# Patient Record
Sex: MALE | Race: WHITE | URBAN - METROPOLITAN AREA
[De-identification: names, ages, dates, MRNs, and addresses within clinical notes are randomized per-mention and may not be internally consistent; named-entity substitution may affect disease eponyms.]

---

## 2022-05-26 ENCOUNTER — INPATIENT (INPATIENT)
Facility: HOSPITAL | Age: 87
LOS: 3 days | Discharge: HOME | DRG: 57 | End: 2022-05-30
Attending: FAMILY MEDICINE | Admitting: INTERNAL MEDICINE
Payer: MEDICARE

## 2022-05-26 VITALS
HEIGHT: 73 IN | HEART RATE: 64 BPM | WEIGHT: 235.01 LBS | OXYGEN SATURATION: 98 % | DIASTOLIC BLOOD PRESSURE: 74 MMHG | SYSTOLIC BLOOD PRESSURE: 142 MMHG | TEMPERATURE: 99 F | RESPIRATION RATE: 17 BRPM

## 2022-05-26 LAB
ALBUMIN SERPL ELPH-MCNC: 3.1 G/DL — LOW (ref 3.3–5)
ALP SERPL-CCNC: 85 U/L — SIGNIFICANT CHANGE UP (ref 40–120)
ALT FLD-CCNC: 21 U/L — SIGNIFICANT CHANGE UP (ref 12–78)
ANION GAP SERPL CALC-SCNC: 7 MMOL/L — SIGNIFICANT CHANGE UP (ref 5–17)
APAP SERPL-MCNC: <2 UG/ML — LOW (ref 10–30)
AST SERPL-CCNC: 17 U/L — SIGNIFICANT CHANGE UP (ref 15–37)
BASOPHILS # BLD AUTO: 0.04 K/UL — SIGNIFICANT CHANGE UP (ref 0–0.2)
BASOPHILS NFR BLD AUTO: 0.3 % — SIGNIFICANT CHANGE UP (ref 0–2)
BILIRUB SERPL-MCNC: 0.3 MG/DL — SIGNIFICANT CHANGE UP (ref 0.2–1.2)
BUN SERPL-MCNC: 29 MG/DL — HIGH (ref 7–23)
CALCIUM SERPL-MCNC: 9.1 MG/DL — SIGNIFICANT CHANGE UP (ref 8.5–10.1)
CHLORIDE SERPL-SCNC: 106 MMOL/L — SIGNIFICANT CHANGE UP (ref 96–108)
CO2 SERPL-SCNC: 30 MMOL/L — SIGNIFICANT CHANGE UP (ref 22–31)
CREAT SERPL-MCNC: 0.93 MG/DL — SIGNIFICANT CHANGE UP (ref 0.5–1.3)
EGFR: 80 ML/MIN/1.73M2 — SIGNIFICANT CHANGE UP
EOSINOPHIL # BLD AUTO: 0.36 K/UL — SIGNIFICANT CHANGE UP (ref 0–0.5)
EOSINOPHIL NFR BLD AUTO: 3 % — SIGNIFICANT CHANGE UP (ref 0–6)
ETHANOL SERPL-MCNC: <10 MG/DL — SIGNIFICANT CHANGE UP (ref 0–10)
GLUCOSE SERPL-MCNC: 123 MG/DL — HIGH (ref 70–99)
HCT VFR BLD CALC: 38.6 % — LOW (ref 39–50)
HGB BLD-MCNC: 12.5 G/DL — LOW (ref 13–17)
IMM GRANULOCYTES NFR BLD AUTO: 0.4 % — SIGNIFICANT CHANGE UP (ref 0–1.5)
LYMPHOCYTES # BLD AUTO: 2.5 K/UL — SIGNIFICANT CHANGE UP (ref 1–3.3)
LYMPHOCYTES # BLD AUTO: 21 % — SIGNIFICANT CHANGE UP (ref 13–44)
MCHC RBC-ENTMCNC: 26.7 PG — LOW (ref 27–34)
MCHC RBC-ENTMCNC: 32.4 GM/DL — SIGNIFICANT CHANGE UP (ref 32–36)
MCV RBC AUTO: 82.3 FL — SIGNIFICANT CHANGE UP (ref 80–100)
MONOCYTES # BLD AUTO: 1 K/UL — HIGH (ref 0–0.9)
MONOCYTES NFR BLD AUTO: 8.4 % — SIGNIFICANT CHANGE UP (ref 2–14)
NEUTROPHILS # BLD AUTO: 7.95 K/UL — HIGH (ref 1.8–7.4)
NEUTROPHILS NFR BLD AUTO: 66.9 % — SIGNIFICANT CHANGE UP (ref 43–77)
PCP SPEC-MCNC: SIGNIFICANT CHANGE UP
PLATELET # BLD AUTO: 284 K/UL — SIGNIFICANT CHANGE UP (ref 150–400)
POTASSIUM SERPL-MCNC: 3.7 MMOL/L — SIGNIFICANT CHANGE UP (ref 3.5–5.3)
POTASSIUM SERPL-SCNC: 3.7 MMOL/L — SIGNIFICANT CHANGE UP (ref 3.5–5.3)
PROT SERPL-MCNC: 6.3 GM/DL — SIGNIFICANT CHANGE UP (ref 6–8.3)
RBC # BLD: 4.69 M/UL — SIGNIFICANT CHANGE UP (ref 4.2–5.8)
RBC # FLD: 15.3 % — HIGH (ref 10.3–14.5)
SALICYLATES SERPL-MCNC: <1.7 MG/DL — LOW (ref 2.8–20)
SODIUM SERPL-SCNC: 143 MMOL/L — SIGNIFICANT CHANGE UP (ref 135–145)
WBC # BLD: 11.9 K/UL — HIGH (ref 3.8–10.5)
WBC # FLD AUTO: 11.9 K/UL — HIGH (ref 3.8–10.5)

## 2022-05-26 PROCEDURE — 99285 EMERGENCY DEPT VISIT HI MDM: CPT

## 2022-05-26 PROCEDURE — 93010 ELECTROCARDIOGRAM REPORT: CPT

## 2022-05-26 NOTE — ED PROVIDER NOTE - PROGRESS NOTE DETAILS
SILVANAG:  Received signout from Dr. Kwok to follow up labs and call telepsych for consult due to SI statements.  Patient calm and cooperative for interview.  No SI endorsed. Telepsych attending aware. JG:  Nurse reports patient sundowning, talking to self, states he was talking to a group of ladies on the side but nurse states nobody was there.  UA negative.  Vitals stable. Telepsych consult pending. JG:  Dr. Corrigan cannot assess patient from psych perspective due to delirium.  Suggests medical admission with psych following.

## 2022-05-26 NOTE — ED PROVIDER NOTE - NS ED ROS FT
Constitutional: No fever or chills  Eyes: No visual changes  HEENT: No throat pain  CV: No chest pain  Resp: No SOB no cough  GI: No abd pain, nausea or vomiting  : No dysuria  MSK: No musculoskeletal pain  Skin: No rash  Neuro: No headache   Psych: SI

## 2022-05-26 NOTE — ED PROVIDER NOTE - NSICDXPASTMEDICALHX_GEN_ALL_CORE_FT
PAST MEDICAL HISTORY:  No pertinent past medical history PAST MEDICAL HISTORY:  Bladder cancer     Dementia     HTN (hypertension)     Hyperlipidemia     Paroxysmal atrial fibrillation

## 2022-05-26 NOTE — ED PROVIDER NOTE - CLINICAL SUMMARY MEDICAL DECISION MAKING FREE TEXT BOX
88 y/o sent to ed for SI per daughter. Pt denies SI/HI. Will obtain blood work and get psych consult.

## 2022-05-26 NOTE — ED ADULT NURSE NOTE - OBJECTIVE STATEMENT
sib facility called daughter and told her "I want to kill myself". pt is A&o X2 confused. unable to tell date,time and president. Does know birthday and age. Does remember telling his daughter he wants to kill himself, he says the rest home was loud and horrible." pt stating " I need help getting the screw out of my neck, I swallowed 2 months ago".  VSS placed on 1:1 for safety WCTM.

## 2022-05-26 NOTE — ED PROVIDER NOTE - PHYSICAL EXAMINATION
Constitutional: Elderly male, laying in bed, NAD AAOx3  Eyes: PERRLA EOMI  Head: Normocephalic atraumatic  Mouth: MMM  Cardiac: regular rate   Resp: Lungs CTAB  GI: Abd s/nt/nd, no rebound or guarding.  Neuro: awake, alert, moving all extremities, cranial nerves 2-12 intact, sensation intact, no dysmetria.  Skin: No rashes   Psych: Denies SI/HI

## 2022-05-26 NOTE — ED PROVIDER NOTE - OBJECTIVE STATEMENT
88 y/o male w/ no pertinent PMHx presents to the ED for SI. Pt reports who jokingly told his daughter that he wants to kill himself and so she called 911. Denies psych hx. Denies SI/HI. No other complaints at this time.

## 2022-05-26 NOTE — ED ADULT TRIAGE NOTE - CHIEF COMPLAINT QUOTE
pt states he was upset this morning and called his daughter and said he wanted to kill himself. pt currently denies si/hi

## 2022-05-26 NOTE — ED ADULT NURSE NOTE - NSIMPLEMENTINTERV_GEN_ALL_ED
Implemented All Fall Risk Interventions:  [place] to call system. Call bell, personal items and telephone within reach. Instruct patient to call for assistance. Room bathroom lighting operational. Non-slip footwear when patient is off stretcher. Physically safe environment: no spills, clutter or unnecessary equipment. Stretcher in lowest position, wheels locked, appropriate side rails in place. Provide visual cue, wrist band, yellow gown, etc. Monitor gait and stability. Monitor for mental status changes and reorient to person, place, and time. Review medications for side effects contributing to fall risk. Reinforce activity limits and safety measures with patient and family.

## 2022-05-27 DIAGNOSIS — G30.1 ALZHEIMER'S DISEASE WITH LATE ONSET: ICD-10-CM

## 2022-05-27 DIAGNOSIS — R41.82 ALTERED MENTAL STATUS, UNSPECIFIED: ICD-10-CM

## 2022-05-27 DIAGNOSIS — R45.851 SUICIDAL IDEATIONS: ICD-10-CM

## 2022-05-27 DIAGNOSIS — I25.10 ATHEROSCLEROTIC HEART DISEASE OF NATIVE CORONARY ARTERY WITHOUT ANGINA PECTORIS: ICD-10-CM

## 2022-05-27 DIAGNOSIS — R41.0 DISORIENTATION, UNSPECIFIED: ICD-10-CM

## 2022-05-27 DIAGNOSIS — F03.90 UNSPECIFIED DEMENTIA WITHOUT BEHAVIORAL DISTURBANCE: ICD-10-CM

## 2022-05-27 LAB
ADD ON TEST-SPECIMEN IN LAB: SIGNIFICANT CHANGE UP
ADD ON TEST-SPECIMEN IN LAB: SIGNIFICANT CHANGE UP

## 2022-05-27 PROCEDURE — 76770 US EXAM ABDO BACK WALL COMP: CPT

## 2022-05-27 PROCEDURE — 82140 ASSAY OF AMMONIA: CPT

## 2022-05-27 PROCEDURE — 93005 ELECTROCARDIOGRAM TRACING: CPT

## 2022-05-27 PROCEDURE — 80053 COMPREHEN METABOLIC PANEL: CPT

## 2022-05-27 PROCEDURE — 84550 ASSAY OF BLOOD/URIC ACID: CPT

## 2022-05-27 PROCEDURE — 97162 PT EVAL MOD COMPLEX 30 MIN: CPT | Mod: GP

## 2022-05-27 PROCEDURE — 87040 BLOOD CULTURE FOR BACTERIA: CPT

## 2022-05-27 PROCEDURE — 97530 THERAPEUTIC ACTIVITIES: CPT | Mod: GP

## 2022-05-27 PROCEDURE — 70450 CT HEAD/BRAIN W/O DYE: CPT | Mod: 26,MA

## 2022-05-27 PROCEDURE — 80048 BASIC METABOLIC PNL TOTAL CA: CPT

## 2022-05-27 PROCEDURE — 83036 HEMOGLOBIN GLYCOSYLATED A1C: CPT

## 2022-05-27 PROCEDURE — 74176 CT ABD & PELVIS W/O CONTRAST: CPT

## 2022-05-27 PROCEDURE — 99232 SBSQ HOSP IP/OBS MODERATE 35: CPT

## 2022-05-27 PROCEDURE — 82306 VITAMIN D 25 HYDROXY: CPT

## 2022-05-27 PROCEDURE — 73030 X-RAY EXAM OF SHOULDER: CPT | Mod: LT

## 2022-05-27 PROCEDURE — 85025 COMPLETE CBC W/AUTO DIFF WBC: CPT

## 2022-05-27 PROCEDURE — 36415 COLL VENOUS BLD VENIPUNCTURE: CPT

## 2022-05-27 PROCEDURE — 84443 ASSAY THYROID STIM HORMONE: CPT

## 2022-05-27 PROCEDURE — 85027 COMPLETE CBC AUTOMATED: CPT

## 2022-05-27 PROCEDURE — 82607 VITAMIN B-12: CPT

## 2022-05-27 PROCEDURE — 82746 ASSAY OF FOLIC ACID SERUM: CPT

## 2022-05-27 PROCEDURE — 82550 ASSAY OF CK (CPK): CPT

## 2022-05-27 PROCEDURE — 84484 ASSAY OF TROPONIN QUANT: CPT

## 2022-05-27 PROCEDURE — 87086 URINE CULTURE/COLONY COUNT: CPT

## 2022-05-27 PROCEDURE — 97116 GAIT TRAINING THERAPY: CPT | Mod: GP

## 2022-05-27 PROCEDURE — 83880 ASSAY OF NATRIURETIC PEPTIDE: CPT

## 2022-05-27 PROCEDURE — 71045 X-RAY EXAM CHEST 1 VIEW: CPT | Mod: 26

## 2022-05-27 PROCEDURE — 80061 LIPID PANEL: CPT

## 2022-05-27 PROCEDURE — 86140 C-REACTIVE PROTEIN: CPT

## 2022-05-27 PROCEDURE — 83605 ASSAY OF LACTIC ACID: CPT

## 2022-05-27 PROCEDURE — 85652 RBC SED RATE AUTOMATED: CPT

## 2022-05-27 PROCEDURE — 99222 1ST HOSP IP/OBS MODERATE 55: CPT

## 2022-05-27 RX ORDER — ACETAMINOPHEN 500 MG
650 TABLET ORAL EVERY 6 HOURS
Refills: 0 | Status: DISCONTINUED | OUTPATIENT
Start: 2022-05-27 | End: 2022-05-30

## 2022-05-27 RX ORDER — SIMVASTATIN 20 MG/1
1 TABLET, FILM COATED ORAL
Qty: 0 | Refills: 0 | DISCHARGE

## 2022-05-27 RX ORDER — ONDANSETRON 8 MG/1
4 TABLET, FILM COATED ORAL EVERY 8 HOURS
Refills: 0 | Status: DISCONTINUED | OUTPATIENT
Start: 2022-05-27 | End: 2022-05-30

## 2022-05-27 RX ORDER — HYDROCHLOROTHIAZIDE 25 MG
12.5 TABLET ORAL ONCE
Refills: 0 | Status: COMPLETED | OUTPATIENT
Start: 2022-05-27 | End: 2022-05-27

## 2022-05-27 RX ORDER — SIMVASTATIN 20 MG/1
20 TABLET, FILM COATED ORAL AT BEDTIME
Refills: 0 | Status: DISCONTINUED | OUTPATIENT
Start: 2022-05-27 | End: 2022-05-30

## 2022-05-27 RX ORDER — ASPIRIN/CALCIUM CARB/MAGNESIUM 324 MG
81 TABLET ORAL DAILY
Refills: 0 | Status: DISCONTINUED | OUTPATIENT
Start: 2022-05-27 | End: 2022-05-30

## 2022-05-27 RX ORDER — PANTOPRAZOLE SODIUM 20 MG/1
40 TABLET, DELAYED RELEASE ORAL
Refills: 0 | Status: DISCONTINUED | OUTPATIENT
Start: 2022-05-27 | End: 2022-05-30

## 2022-05-27 RX ORDER — DIGOXIN 250 MCG
125 TABLET ORAL DAILY
Refills: 0 | Status: DISCONTINUED | OUTPATIENT
Start: 2022-05-27 | End: 2022-05-30

## 2022-05-27 RX ORDER — METOPROLOL TARTRATE 50 MG
50 TABLET ORAL DAILY
Refills: 0 | Status: DISCONTINUED | OUTPATIENT
Start: 2022-05-27 | End: 2022-05-30

## 2022-05-27 RX ORDER — DULOXETINE HYDROCHLORIDE 30 MG/1
60 CAPSULE, DELAYED RELEASE ORAL DAILY
Refills: 0 | Status: DISCONTINUED | OUTPATIENT
Start: 2022-05-27 | End: 2022-05-30

## 2022-05-27 RX ORDER — LANOLIN ALCOHOL/MO/W.PET/CERES
3 CREAM (GRAM) TOPICAL AT BEDTIME
Refills: 0 | Status: DISCONTINUED | OUTPATIENT
Start: 2022-05-27 | End: 2022-05-28

## 2022-05-27 RX ADMIN — SIMVASTATIN 20 MILLIGRAM(S): 20 TABLET, FILM COATED ORAL at 21:11

## 2022-05-27 RX ADMIN — Medication 50 MILLIGRAM(S): at 14:43

## 2022-05-27 RX ADMIN — PANTOPRAZOLE SODIUM 40 MILLIGRAM(S): 20 TABLET, DELAYED RELEASE ORAL at 14:43

## 2022-05-27 RX ADMIN — Medication 12.5 MILLIGRAM(S): at 14:43

## 2022-05-27 RX ADMIN — Medication 125 MICROGRAM(S): at 14:43

## 2022-05-27 RX ADMIN — DULOXETINE HYDROCHLORIDE 60 MILLIGRAM(S): 30 CAPSULE, DELAYED RELEASE ORAL at 14:43

## 2022-05-27 NOTE — PHARMACOTHERAPY INTERVENTION NOTE - COMMENTS
Medication reconciliation was completed after speaking to patient's daughter and checking Dr. Watkins.

## 2022-05-27 NOTE — PROGRESS NOTE BEHAVIORAL HEALTH - NSBHCHARTREVIEWLAB_PSY_A_CORE FT
12.5   11.90 )-----------( 284      ( 26 May 2022 22:32 )             38.6   05-26    143  |  106  |  29<H>  ----------------------------<  123<H>  3.7   |  30  |  0.93    Ca    9.1      26 May 2022 22:32    TPro  6.3  /  Alb  3.1<L>  /  TBili  0.3  /  DBili  x   /  AST  17  /  ALT  21  /  AlkPhos  85  05-26

## 2022-05-27 NOTE — PHYSICAL THERAPY INITIAL EVALUATION ADULT - PERTINENT HX OF CURRENT PROBLEM, REHAB EVAL
88 y/o/m with history of dementia, that was admitted in the hospital 5/26 with suicidal ideations. Patient had stated to the daughter out of context that he wanted to kill himself.  Per daughter: patient has been having a significant cognitive decline. Has been in Nursing home facility (7.5 weeks).  [facility] - Memory care - patient is having a very hard time adjusting to the facility.  CT head (-).

## 2022-05-27 NOTE — PATIENT PROFILE ADULT - FUNCTIONAL SCREEN CURRENT LEVEL: SWALLOWING (IF SCORE 2 OR MORE FOR ANY ITEM, CONSULT REHAB SERVICES), MLM)
Impression: Myopia, bilateral: H52.13. Plan: Discussed diagnosis in detail with patient. New glasses Rx was given today.
0 = swallows foods/liquids without difficulty

## 2022-05-27 NOTE — ED ADULT NURSE REASSESSMENT NOTE - NS ED NURSE REASSESS COMMENT FT1
Pt resting comfortably in bed with no acute distress noted. Pt on constant observation, updated on their status, the current plan of care, and available results no needs or requests at this time.  Will continue to monitor/reassess. Pt resting comfortably in bed with no acute distress noted. Pt on constant observation, linen changed, meal ordered.

## 2022-05-27 NOTE — H&P ADULT - NSICDXPASTMEDICALHX_GEN_ALL_CORE_FT
PAST MEDICAL HISTORY:  Bladder cancer     Dementia     HTN (hypertension)     Hyperlipidemia     Paroxysmal atrial fibrillation

## 2022-05-27 NOTE — H&P ADULT - HISTORY OF PRESENT ILLNESS
Patient is a 88 y/o/m with history of dementia, that was admitted in the hospital 5/26 with suicidal ideations. Patient denies any HA, CP, SOB. No fevers, chills or shakes. Labs and vitals reviewed. This being said, according to the patient's ED note -  patient had stated to the daughter out of context that he wanted to kill himself. Patient explained to me that he was joking. Currently, patient denies any suicidal ideations.     Patient is limited historian, but does describe me what happened. Unable to recall what medical problems he has. Patient's family phone number not in the chart to obtain further history or to have a accurate GOC discussions.        Patient is a 86 y/o/m with history of dementia, that was admitted in the hospital 5/26 with suicidal ideations. Patient denies any HA, CP, SOB. No fevers, chills or shakes. Labs and vitals reviewed. This being said, according to the patient's ED note -  patient had stated to the daughter out of context that he wanted to kill himself. Patient explained to me that he was joking. Currently, patient denies any suicidal ideations.     Patient is limited historian, but does describe me what happened. Unable to recall what medical problems he has. Patient's family phone number not in the chart to obtain further history or to have a accurate GOC discussions.    Care discussed with patient's daughter. Since easter -  patient has been having a significant cognitive decline. Has been in Nursing home facility (7.5 weeks).  [facility] - Memory care - patient is having a very hard time adjusting to the facility (patient is sitting in a same place all the time /  dementia patients around).        Patient is a 86 y/o/m with history of dementia, that was admitted in the hospital 5/26 with suicidal ideations. Patient denies any HA, CP, SOB. No fevers, chills or shakes. Labs and vitals reviewed. This being said, according to the patient's ED note -  patient had stated to the daughter out of context that he wanted to kill himself. Patient explained to me that he was joking. Currently, patient denies any suicidal ideations.     Patient is limited historian, but does describe me what happened. Unable to recall what medical problems he has. Patient's family phone number not in the chart to obtain further history or to have a accurate GOC discussions.    Addendum:   Care discussed with patient's daughter. Since easter -  patient has been having a significant cognitive decline. Has been in Nursing home facility (7.5 weeks).  [facility] - Memory care - patient is having a very hard time adjusting to the facility (patient is sitting in a same place all the time /  dementia patients around). Care discussed with Social work -  as patient does not needs to be in the hospital and needs placement. Care discussed with patient's daughter isha and social work.

## 2022-05-27 NOTE — H&P ADULT - NSHPLABSRESULTS_GEN_ALL_CORE
CBC Full  -  ( 26 May 2022 22:32 )  WBC Count : 11.90 K/uL  RBC Count : 4.69 M/uL  Hemoglobin : 12.5 g/dL  Hematocrit : 38.6 %  Platelet Count - Automated : 284 K/uL  Mean Cell Volume : 82.3 fl  Mean Cell Hemoglobin : 26.7 pg  Mean Cell Hemoglobin Concentration : 32.4 gm/dL  Auto Neutrophil # : 7.95 K/uL  Auto Lymphocyte # : 2.50 K/uL  Auto Monocyte # : 1.00 K/uL  Auto Eosinophil # : 0.36 K/uL  Auto Basophil # : 0.04 K/uL  Auto Neutrophil % : 66.9 %  Auto Lymphocyte % : 21.0 %  Auto Monocyte % : 8.4 %  Auto Eosinophil % : 3.0 %  Auto Basophil % : 0.3 %    PT/INR - ( 26 May 2022 22:33 )   PT: 13.2 sec;   INR: 1.14 ratio         PTT - ( 26 May 2022 22:33 )  PTT:33.8 sec  Urinalysis Basic - ( 26 May 2022 22:24 )    Color: Pale Yellow / Appearance: Clear / S.020 / pH: x  Gluc: x / Ketone: Negative  / Bili: Negative / Urobili: Negative   Blood: x / Protein: 30 mg/dL / Nitrite: Negative   Leuk Esterase: Negative / RBC: 0-2 /HPF / WBC Negative   Sq Epi: x / Non Sq Epi: Negative / Bacteria: Occasional          143  |  106  |  29<H>  ----------------------------<  123<H>  3.7   |  30  |  0.93    Ca    9.1      26 May 2022 22:32    TPro  6.3  /  Alb  3.1<L>  /  TBili  0.3  /  DBili  x   /  AST  17  /  ALT  21  /  AlkPhos  85

## 2022-05-27 NOTE — PHYSICAL THERAPY INITIAL EVALUATION ADULT - ACTIVE RANGE OF MOTION EXAMINATION, REHAB EVAL
bilateral lower extremity Active ROM was WNL (within normal limits)/bilateral upper extremity Active ROM was WFL (within functional limits)/bilateral  lower extremity Active ROM was WFL (within functional limits)

## 2022-05-27 NOTE — H&P ADULT - PROBLEM SELECTOR PLAN 2
- 1:1  - currently denies suicidal ideation  - Psych eval - 1:1  - currently denies suicidal ideation  - Psych eval appreciated

## 2022-05-27 NOTE — PROGRESS NOTE BEHAVIORAL HEALTH - NSBHCHARTREVIEWIMAGING_PSY_A_CORE FT
PROCEDURE DATE:  05/27/2022          INTERPRETATION:  HISTORY: Altered mental status.    COMPARISON: None.    TECHNIQUE: Axial noncontrast CT images from the skull base to the vertex   were obtained and submitted for interpretation. Coronal and sagittal   reformatted images were performed. Bone and soft tissue windows were   evaluated.    FINDINGS:    There is no acute intracranial mass-effect, hemorrhage, midline shift, or   abnormal extra-axial fluid collection.    Chronic bilateral lacunar infarctions are present. Patchy and confluent   regions of periventricular and deep cerebral white matter hypoattenuation   due to chronic microangiopathic ischemic changes. Atheromatous   calcifications along the carotid siphons are present.    Mild centrally predominant cerebral volume loss noted.  No evidence of   hydrocephalus. Basal cisterns are patent.    Visualized paranasal sinuses  and mastoid air cells are clear. Calvarium   is intact.    IMPRESSION:    No acute intracranial bleeding.    Volume loss, chronic microvascular ischemic changes, and chronic lacunar   infarctions.

## 2022-05-27 NOTE — H&P ADULT - NSHPREVIEWOFSYSTEMS_GEN_ALL_CORE
REVIEW OF SYSTEMS:  General: NAD, hemodynamically stable, + weakness  HEENT:  Eyes:  No visual loss, blurred vision, double vision or yellow sclerae. Ears, Nose, Throat:  No hearing loss, sneezing, congestion, runny nose or sore throat.  SKIN:  No rash or itching.  CARDIOVASCULAR:  No chest pain, chest pressure or chest discomfort. No palpitations or edema.  RESPIRATORY:  No shortness of breath, cough or sputum.  GASTROINTESTINAL:  No anorexia, nausea, vomiting or diarrhea. No abdominal pain or blood.  NEUROLOGICAL:  No headache, dizziness, syncope, paralysis, ataxia, numbness or tingling in the extremities. No change in bowel or bladder control.  MUSCULOSKELETAL:  No muscle, back pain, joint pain or stiffness.  HEMATOLOGIC:  No anemia, bleeding or bruising.  LYMPHATICS:  No enlarged nodes. No history of splenectomy.  ENDOCRINOLOGIC:  No reports of sweating, cold or heat intolerance. No polyuria or polydipsia.  ALLERGIES:  No history of asthma, hives, eczema or rhinitis. REVIEW OF SYSTEMS:  General:  + weakness  HEENT:  Eyes:  No visual loss, blurred vision, double vision or yellow sclerae. Ears, Nose, Throat:  No hearing loss, sneezing, congestion, runny nose or sore throat.  SKIN:  No rash or itching.  CARDIOVASCULAR:  No chest pain, chest pressure or chest discomfort. No palpitations or edema.  RESPIRATORY:  No shortness of breath, cough or sputum.  GASTROINTESTINAL:  No anorexia, nausea, vomiting or diarrhea. No abdominal pain or blood.  NEUROLOGICAL:  No headache, dizziness, syncope, paralysis, ataxia, numbness or tingling in the extremities. No change in bowel or bladder control.  MUSCULOSKELETAL:  No muscle, back pain, joint pain or stiffness.  HEMATOLOGIC:  No anemia, bleeding or bruising.  LYMPHATICS:  No enlarged nodes. No history of splenectomy.  ENDOCRINOLOGIC:  No reports of sweating, cold or heat intolerance. No polyuria or polydipsia.  ALLERGIES:  No history of asthma, hives, eczema or rhinitis.

## 2022-05-27 NOTE — H&P ADULT - PROBLEM SELECTOR PLAN 1
- in the setting of dementia  - CT : No acute intracranial bleeding. Volume loss, chronic microvascular ischemic changes, and chronic lacunar   infarctions.  - no signs of infection  - no focal deficits - Resident of [facility] care  - CT : No acute intracranial bleeding. Volume loss, chronic microvascular ischemic changes, and chronic lacunar   infarctions.  - no signs of infection  - no focal deficits  - care discussed with patient and patient's daughter - Resident of [facility]  - CT : No acute intracranial bleeding. Volume loss, chronic microvascular ischemic changes, and chronic lacunar   infarctions.  - no signs of infection  - no focal deficits  - care discussed with patient and patient's daughter - awaiting placement

## 2022-05-27 NOTE — PROGRESS NOTE BEHAVIORAL HEALTH - SUMMARY
Pt is a 88yo M, resides at nursing home, with no pph and pmh of dementia, paroxysmal Afib, HTN, HLD, hx of bladder cancer, who made a vause statesmsmt to his daughtatr about [facility],. Pt ha sno recolecstion, he denioes any thoghst about dying or sucidde and his daughetr ahsno safety concerns     Patient appears altered, disoriented, and unable to participate in an evaluation. Recommend admission to medicine for possible delirium vs acute on chronic dementia as pt has elevated WBC and AMS. Psych CL to follow

## 2022-05-27 NOTE — PROGRESS NOTE BEHAVIORAL HEALTH - NSBHFUPINTERVALHXFT_PSY_A_CORE
PT si 87 YOWM with dementia who resides in [facility]. PT is comfortable, was sleeping, upon awakening pleasant and cooperative, He si [place] and  reads lips.   PT denies    being  depressed or anxious.. Denies AH and VH  No PI.  Pt does not know his age (67), he does not know  the date and does not know where he is.  PT does not remember saying that he wants' to kill himself or that he wants to die. He denies such thoughts.   Per his daughter Elisa,  [phone] PT has no psych hx, never saw psychiatrist. recently moved to NH, yesterday was unhappy with the environment said told that he would kill himself if situation does not improve,.  Daughter told that to staff of NH and they called 911. Daughter does not have any safety concerns about the pt and she does not think he would harm himself.

## 2022-05-27 NOTE — PHYSICAL THERAPY INITIAL EVALUATION ADULT - PHYSICAL ASSIST/NONPHYSICAL ASSIST: SUPINE/SIT, REHAB EVAL
assist as patient not understanding task./verbal cues/nonverbal cues (demo/gestures)/1 person assist

## 2022-05-27 NOTE — PROGRESS NOTE BEHAVIORAL HEALTH - NSBHCHARTREVIEWVS_PSY_A_CORE FT
Vital Signs Last 24 Hrs  T(C): 36.4 (27 May 2022 13:02), Max: 37.1 (26 May 2022 20:14)  T(F): 97.5 (27 May 2022 13:02), Max: 98.7 (26 May 2022 20:14)  HR: 80 (27 May 2022 13:02) (64 - 80)  BP: 149/77 (27 May 2022 13:02) (142/74 - 160/99)  BP(mean): 115 (26 May 2022 21:01) (115 - 115)  RR: 18 (27 May 2022 13:02) (17 - 18)  SpO2: 97% (27 May 2022 13:02) (94% - 100%)

## 2022-05-27 NOTE — ED ADULT NURSE REASSESSMENT NOTE - NS ED NURSE REASSESS COMMENT FT1
Pt resting comfortably in bed with no acute distress noted. axo1, Pt repositioned, VSS, 1:1 for safety.

## 2022-05-27 NOTE — ED ADULT NURSE REASSESSMENT NOTE - NS ED NURSE REASSESS COMMENT FT1
Pt resting comfortably in bed with no acute distress noted. Pt on constant observation, pt ate breakfast, no needs at this time.

## 2022-05-27 NOTE — ED BEHAVIORAL HEALTH ASSESSMENT NOTE - NSBHPSYCHOLCOGABN_PSY_A_CORE
unable to assess/disoriented to time/disoriented to place/disoriented to person/disoriented to situation

## 2022-05-27 NOTE — ED BEHAVIORAL HEALTH ASSESSMENT NOTE - SUMMARY
Pt is a 86yo M, resides at nursing home, with unknown pph and pmh of dementia, paroxysmal Afib, HTN, HLD, hx of bladder cancer, who is bibems for SI to daughter.     Patient appears altered, disoriented, and unable to participate in an evaluation. Recommend admission to medicine for possible delirium vs acute on chronic dementia as pt has elevated WBC and AMS. Psych CL to follow

## 2022-05-27 NOTE — ED BEHAVIORAL HEALTH ASSESSMENT NOTE - HPI (INCLUDE ILLNESS QUALITY, SEVERITY, DURATION, TIMING, CONTEXT, MODIFYING FACTORS, ASSOCIATED SIGNS AND SYMPTOMS)
Pt is a 86yo M, resides at nursing home, with unknown pph and pmh of dementia, paroxysmal Afib, HTN, HLD, hx of bladder cancer, who is bibems for SI to daughter.     On eval, patient appeared altered and disoriented. He was unable to answer any questions or follow directions.

## 2022-05-27 NOTE — PHYSICAL THERAPY INITIAL EVALUATION ADULT - MODALITIES TREATMENT COMMENTS
Patient  left in recliner chair with tray attached, CBIR and chair alarm active. MD, CM, RN informed of session/status.

## 2022-05-27 NOTE — PATIENT PROFILE ADULT - FALL HARM RISK - HARM RISK INTERVENTIONS
Assistance with ambulation/Assistance OOB with selected safe patient handling equipment/Communicate Risk of Fall with Harm to all staff/Discuss with provider need for PT consult/Monitor for mental status changes/Monitor gait and stability/Move patient closer to nurses' station/Provide patient with walking aids - walker, cane, crutches/Reinforce activity limits and safety measures with patient and family/Reorient to person, place and time as needed/Sit up slowly, dangle for a short time, stand at bedside before walking/Tailored Fall Risk Interventions/Toileting schedule using arm’s reach rule for commode and bathroom/Use of alarms - bed, chair and/or voice tab/Visual Cue: Yellow wristband and red socks/Bed in lowest position, wheels locked, appropriate side rails in place/Call bell, personal items and telephone in reach/Instruct patient to call for assistance before getting out of bed or chair/Non-slip footwear when patient is out of bed/[place] to call system/Physically safe environment - no spills, clutter or unnecessary equipment/Purposeful Proactive Rounding/Room/bathroom lighting operational, light cord in reach

## 2022-05-27 NOTE — ED BEHAVIORAL HEALTH ASSESSMENT NOTE - DOMICILED WITH
HPI: 72 yo male with PMHx of DM, HTN and prior history of pancreatitis.  Came in to the ED with severe abdominal pain 10/10, That started 3 days prior to admission after eating fried food.  He describes the pain as sharp/pressure like that starts on the epigastric are and radiates to the right side of the abdomen and when it gets worse it goes all the way to the back.  He tried taking OTC medications, but the pain did not improve. The pain gets worse whenever he eats or drinks and feels better when he's stomach is empty.  He was here in August with similar pain and it was also associated with food.  Patient denies any headache, SOB, palpitations, N/V, diarrhea, constipation.  Patient denies tobacco or EtOH intake.     Subjective: Pt seen at bedside, AOx3, NAD. Pt seen at bedside. Still having abdominal pain. On clear liquid diet, did not tolerate diet yesterday.     REVIEW OF SYSTEMS:  CONSTITUTIONAL: No weakness, fevers or chills  EYES/ENT: No visual changes;  No vertigo or throat pain   NECK: No pain or stiffness  RESPIRATORY: No cough, wheezing, hemoptysis; No shortness of breath  CARDIOVASCULAR: No chest pain or palpitations  GASTROINTESTINAL: + abdominal and epigastric pain improving. No nausea, vomiting, or hematemesis; No diarrhea or constipation. No melena or hematochezia.  GENITOURINARY: No dysuria, frequency or hematuria  NEUROLOGICAL: No numbness or weakness  SKIN: No itching, burning, rashes, or lesions   All other review of systems is negative unless indicated above    Vital Signs Last 24 Hrs  T(C): 36.7 (26 Oct 2020 16:00), Max: 36.8 (25 Oct 2020 21:05)  T(F): 98 (26 Oct 2020 16:00), Max: 98.2 (25 Oct 2020 21:05)  HR: 69 (26 Oct 2020 16:00) (62 - 69)  BP: 156/74 (26 Oct 2020 16:00) (156/74 - 174/82)  BP(mean): --  RR: 18 (26 Oct 2020 16:00) (16 - 18)  SpO2: 96% (26 Oct 2020 16:00) (96% - 98%)    POCT Blood Glucose.: 193 mg/dL (25 Oct 2020 10:33)  POCT Blood Glucose.: 129 mg/dL (25 Oct 2020 07:37)  POCT Blood Glucose.: 127 mg/dL (24 Oct 2020 22:19)  POCT Blood Glucose.: 147 mg/dL (24 Oct 2020 20:55)  POCT Blood Glucose.: 89 mg/dL (24 Oct 2020 17:09)      PHYSICAL EXAM:  Constitutional: NAD, awake and alert, well-developed  HEENT: PERR, EOMI, Normal Hearing, MMM  Neck: Soft and supple, No LAD  Respiratory: Breath sounds are clear bilaterally, No wheezing, rales or rhonchi  Cardiovascular: S1 and S2, regular rate and rhythm, no Murmurs, gallops or rubs  Gastrointestinal: Bowel Sounds present, soft, nontender, nondistended, no guarding, no rebound  Extremities: No peripheral edema  Vascular: 2+ peripheral pulses  Neurological: A/O x 3, no focal deficits  Musculoskeletal: 5/5 strength b/l upper and lower extremities  Skin: No rashes    MEDICATIONS:  MEDICATIONS  (STANDING):  amLODIPine   Tablet 5 milliGRAM(s) Oral daily  dextrose 5%. 1000 milliLiter(s) (50 mL/Hr) IV Continuous <Continuous>  dextrose 50% Injectable 12.5 Gram(s) IV Push once  dextrose 50% Injectable 25 Gram(s) IV Push once  dextrose 50% Injectable 25 Gram(s) IV Push once  insulin glargine Injectable (LANTUS) 10 Unit(s) SubCutaneous at bedtime  insulin lispro (ADMELOG) corrective regimen sliding scale   SubCutaneous three times a day before meals  insulin lispro (ADMELOG) corrective regimen sliding scale   SubCutaneous at bedtime  pantoprazole    Tablet 40 milliGRAM(s) Oral before breakfast  sodium chloride 0.9%. 1000 milliLiter(s) (150 mL/Hr) IV Continuous <Continuous>      Labs             13.0   5.56  )-----------( 242      ( 26 Oct 2020 08:14 )             39.5   10-26    142  |  110<H>  |  7   ----------------------------<  126<H>  4.2   |  25  |  0.98    Ca    9.0      26 Oct 2020 08:14    TPro  6.6  /  Alb  2.6<L>  /  TBili  0.4  /  DBili  x   /  AST  16  /  ALT  23  /  AlkPhos  75  10-26        RADIOLOGY/EKG:  < from: CT Abdomen and Pelvis w/ IV Cont (10.23.20 @ 19:01) >  IMPRESSION:  Evidence of acute pancreatitis predominantly involving the pancreatic head and neck.    < end of copied text >   Other HPI: 72 yo male with PMHx of DM, HTN and prior history of pancreatitis.  Came in to the ED with severe abdominal pain 10/10, That started 3 days prior to admission after eating fried food.  He describes the pain as sharp/pressure like that starts on the epigastric are and radiates to the right side of the abdomen and when it gets worse it goes all the way to the back.  He tried taking OTC medications, but the pain did not improve. The pain gets worse whenever he eats or drinks and feels better when he's stomach is empty.  He was here in August with similar pain and it was also associated with food.  Patient denies any headache, SOB, palpitations, N/V, diarrhea, constipation.  Patient denies tobacco or EtOH intake.     Subjective: Pt seen at bedside, AOx3, NAD. Pt seen at bedside. Still having abdominal pain. On clear liquid diet, did not tolerate diet yesterday.     REVIEW OF SYSTEMS:  CONSTITUTIONAL: No weakness, fevers or chills  EYES/ENT: No visual changes;  No vertigo or throat pain   NECK: No pain or stiffness  RESPIRATORY: No cough, wheezing, hemoptysis; No shortness of breath  CARDIOVASCULAR: No chest pain or palpitations  GASTROINTESTINAL: + abdominal and epigastric pain improving. No nausea, vomiting, or hematemesis; No diarrhea or constipation. No melena or hematochezia.  GENITOURINARY: No dysuria, frequency or hematuria  NEUROLOGICAL: No numbness or weakness  SKIN: No itching, burning, rashes, or lesions   All other review of systems is negative unless indicated above    Vital Signs Last 24 Hrs  T(C): 36.7 (26 Oct 2020 16:00), Max: 36.8 (25 Oct 2020 21:05)  T(F): 98 (26 Oct 2020 16:00), Max: 98.2 (25 Oct 2020 21:05)  HR: 69 (26 Oct 2020 16:00) (62 - 69)  BP: 156/74 (26 Oct 2020 16:00) (156/74 - 174/82)  BP(mean): --  RR: 18 (26 Oct 2020 16:00) (16 - 18)  SpO2: 96% (26 Oct 2020 16:00) (96% - 98%)    POCT Blood Glucose.: 193 mg/dL (25 Oct 2020 10:33)  POCT Blood Glucose.: 129 mg/dL (25 Oct 2020 07:37)  POCT Blood Glucose.: 127 mg/dL (24 Oct 2020 22:19)  POCT Blood Glucose.: 147 mg/dL (24 Oct 2020 20:55)  POCT Blood Glucose.: 89 mg/dL (24 Oct 2020 17:09)      PHYSICAL EXAM:  Constitutional: NAD, awake and alert, well-developed  HEENT: PERR, EOMI, Normal Hearing, MMM  Neck: Soft and supple, No LAD  Respiratory: Breath sounds are clear bilaterally, No wheezing, rales or rhonchi  Cardiovascular: S1 and S2, regular rate and rhythm, no Murmurs, gallops or rubs  Gastrointestinal: Bowel Sounds present, TTP in epigastric region, soft, nondistended, no guarding, no rebound  Extremities: No peripheral edema  Vascular: 2+ peripheral pulses  Neurological: A/O x 3, no focal deficits  Musculoskeletal: 5/5 strength b/l upper and lower extremities  Skin: No rashes    MEDICATIONS:  MEDICATIONS  (STANDING):  amLODIPine   Tablet 5 milliGRAM(s) Oral daily  dextrose 5%. 1000 milliLiter(s) (50 mL/Hr) IV Continuous <Continuous>  dextrose 50% Injectable 12.5 Gram(s) IV Push once  dextrose 50% Injectable 25 Gram(s) IV Push once  dextrose 50% Injectable 25 Gram(s) IV Push once  insulin glargine Injectable (LANTUS) 10 Unit(s) SubCutaneous at bedtime  insulin lispro (ADMELOG) corrective regimen sliding scale   SubCutaneous three times a day before meals  insulin lispro (ADMELOG) corrective regimen sliding scale   SubCutaneous at bedtime  pantoprazole    Tablet 40 milliGRAM(s) Oral before breakfast  sodium chloride 0.9%. 1000 milliLiter(s) (150 mL/Hr) IV Continuous <Continuous>      Labs             13.0   5.56  )-----------( 242      ( 26 Oct 2020 08:14 )             39.5   10-26    142  |  110<H>  |  7   ----------------------------<  126<H>  4.2   |  25  |  0.98    Ca    9.0      26 Oct 2020 08:14    TPro  6.6  /  Alb  2.6<L>  /  TBili  0.4  /  DBili  x   /  AST  16  /  ALT  23  /  AlkPhos  75  10-26        RADIOLOGY/EKG:  < from: CT Abdomen and Pelvis w/ IV Cont (10.23.20 @ 19:01) >  IMPRESSION:  Evidence of acute pancreatitis predominantly involving the pancreatic head and neck.    < end of copied text >

## 2022-05-28 LAB
24R-OH-CALCIDIOL SERPL-MCNC: 55.5 NG/ML — SIGNIFICANT CHANGE UP (ref 30–80)
A1C WITH ESTIMATED AVERAGE GLUCOSE RESULT: 6.1 % — HIGH (ref 4–5.6)
ADD ON TEST-SPECIMEN IN LAB: SIGNIFICANT CHANGE UP
ANION GAP SERPL CALC-SCNC: 6 MMOL/L — SIGNIFICANT CHANGE UP (ref 5–17)
BUN SERPL-MCNC: 23 MG/DL — SIGNIFICANT CHANGE UP (ref 7–23)
CALCIUM SERPL-MCNC: 8.9 MG/DL — SIGNIFICANT CHANGE UP (ref 8.5–10.1)
CHLORIDE SERPL-SCNC: 109 MMOL/L — HIGH (ref 96–108)
CK SERPL-CCNC: 66 U/L — SIGNIFICANT CHANGE UP (ref 26–308)
CO2 SERPL-SCNC: 28 MMOL/L — SIGNIFICANT CHANGE UP (ref 22–31)
CREAT SERPL-MCNC: 0.71 MG/DL — SIGNIFICANT CHANGE UP (ref 0.5–1.3)
EGFR: 89 ML/MIN/1.73M2 — SIGNIFICANT CHANGE UP
ESTIMATED AVERAGE GLUCOSE: 128 MG/DL — HIGH (ref 68–114)
FOLATE SERPL-MCNC: >20 NG/ML — SIGNIFICANT CHANGE UP
GLUCOSE SERPL-MCNC: 119 MG/DL — HIGH (ref 70–99)
HCT VFR BLD CALC: 40.9 % — SIGNIFICANT CHANGE UP (ref 39–50)
HGB BLD-MCNC: 13 G/DL — SIGNIFICANT CHANGE UP (ref 13–17)
MCHC RBC-ENTMCNC: 26.5 PG — LOW (ref 27–34)
MCHC RBC-ENTMCNC: 31.8 GM/DL — LOW (ref 32–36)
MCV RBC AUTO: 83.3 FL — SIGNIFICANT CHANGE UP (ref 80–100)
NT-PROBNP SERPL-SCNC: 1719 PG/ML — HIGH (ref 0–450)
PLATELET # BLD AUTO: 281 K/UL — SIGNIFICANT CHANGE UP (ref 150–400)
POTASSIUM SERPL-MCNC: 3.7 MMOL/L — SIGNIFICANT CHANGE UP (ref 3.5–5.3)
POTASSIUM SERPL-SCNC: 3.7 MMOL/L — SIGNIFICANT CHANGE UP (ref 3.5–5.3)
RBC # BLD: 4.91 M/UL — SIGNIFICANT CHANGE UP (ref 4.2–5.8)
RBC # FLD: 14.8 % — HIGH (ref 10.3–14.5)
SODIUM SERPL-SCNC: 143 MMOL/L — SIGNIFICANT CHANGE UP (ref 135–145)
TROPONIN I, HIGH SENSITIVITY RESULT: 9.77 NG/L — SIGNIFICANT CHANGE UP
TSH SERPL-MCNC: 2.81 UU/ML — SIGNIFICANT CHANGE UP (ref 0.34–4.82)
URATE SERPL-MCNC: 5.4 MG/DL — SIGNIFICANT CHANGE UP (ref 3.4–8.8)
VIT B12 SERPL-MCNC: 579 PG/ML — SIGNIFICANT CHANGE UP (ref 232–1245)
WBC # BLD: 13.5 K/UL — HIGH (ref 3.8–10.5)
WBC # FLD AUTO: 13.5 K/UL — HIGH (ref 3.8–10.5)

## 2022-05-28 PROCEDURE — 73030 X-RAY EXAM OF SHOULDER: CPT | Mod: 26,LT

## 2022-05-28 PROCEDURE — 99233 SBSQ HOSP IP/OBS HIGH 50: CPT

## 2022-05-28 PROCEDURE — 93010 ELECTROCARDIOGRAM REPORT: CPT

## 2022-05-28 PROCEDURE — 74176 CT ABD & PELVIS W/O CONTRAST: CPT | Mod: 26

## 2022-05-28 PROCEDURE — 76770 US EXAM ABDO BACK WALL COMP: CPT | Mod: 26

## 2022-05-28 PROCEDURE — 99497 ADVNCD CARE PLAN 30 MIN: CPT

## 2022-05-28 RX ORDER — DEXLANSOPRAZOLE 30 MG/1
1 CAPSULE, DELAYED RELEASE ORAL
Qty: 0 | Refills: 0 | DISCHARGE

## 2022-05-28 RX ORDER — LANOLIN ALCOHOL/MO/W.PET/CERES
5 CREAM (GRAM) TOPICAL AT BEDTIME
Refills: 0 | Status: DISCONTINUED | OUTPATIENT
Start: 2022-05-28 | End: 2022-05-30

## 2022-05-28 RX ADMIN — Medication 1 TABLET(S): at 21:48

## 2022-05-28 RX ADMIN — DULOXETINE HYDROCHLORIDE 60 MILLIGRAM(S): 30 CAPSULE, DELAYED RELEASE ORAL at 09:26

## 2022-05-28 RX ADMIN — Medication 5 MILLIGRAM(S): at 21:49

## 2022-05-28 RX ADMIN — Medication 5 MILLIGRAM(S): at 21:48

## 2022-05-28 RX ADMIN — Medication 50 MILLIGRAM(S): at 09:26

## 2022-05-28 RX ADMIN — Medication 125 MICROGRAM(S): at 09:26

## 2022-05-28 RX ADMIN — PANTOPRAZOLE SODIUM 40 MILLIGRAM(S): 20 TABLET, DELAYED RELEASE ORAL at 06:18

## 2022-05-28 RX ADMIN — SIMVASTATIN 20 MILLIGRAM(S): 20 TABLET, FILM COATED ORAL at 21:49

## 2022-05-28 RX ADMIN — Medication 81 MILLIGRAM(S): at 09:26

## 2022-05-28 NOTE — PROGRESS NOTE ADULT - PROBLEM SELECTOR PLAN 1
- Resident of [facility]  - CT : No acute intracranial bleeding. Volume loss, chronic microvascular ischemic changes, and chronic lacunar   infarctions.  - no signs of infection  - no focal deficits  - care discussed with patient and patient's daughter - awaiting placement

## 2022-05-28 NOTE — PROGRESS NOTE ADULT - ASSESSMENT
86 y/o/m with PMH of Dementia, Paroxysmal A fib on ASA 81 only, CAD , h/o bladder cancer, HLD, HTN, macular degeneration, hearing loss, chronic back pain admitted to the hospital  on 5/27 with suicidal ideations.    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 Volume loss, chronic microvascular ischemic changes, and chronic lacunar   infarctions.  - TSH wnl, check B12, folate , ammonia   - patient would benefit from AC given lacunar infarction and A fib on EKG   - cardiology consult for recommendations  - c/w ASA 18 , BB, digoxin ,  statins, troponin x 1 neg, BNP elevated 1719  - 2 d echo  - pending, elevated BNP   Left shoulder pain with h/o frequent falls  - xray to r/o fracture  - lidocaine patch  - check orthostatics, vit D level  Mild leukocytosis h/o bladder cancer as per daughter  1.9 cm lateral RIGHT renal inter-/lower pole higher than fluid  attenuation lesion, indeterminate.  - us kidney for better evaluation  - renal function wnl, CRP 18   - check urine and blood cultures   Chronic back pain   - c/w duloxetine  HTN   - c/w BB, HTCZ stopped ( increases risk of fall in elderly )     Advance directives   DNR/DNI/no feeding tubes   [phone] HCP daughter Elisa Wood       88 y/o/m with PMH of Dementia, Paroxysmal A fib on ASA 81 only, CAD , h/o bladder cancer, HLD, HTN, macular degeneration, hearing loss, chronic back pain admitted to the hospital  on 5/27 with suicidal ideations.    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 Volume loss, chronic microvascular ischemic changes, and chronic lacunar   infarctions.  - TSH wnl, check B12, folate , ammonia   - patient would benefit from AC given lacunar infarction and A fib on EKG   - cardiology consult for recommendations  - c/w ASA 18 , BB, digoxin ,  statins, troponin x 1 neg, BNP elevated 1719  - 2 d echo  - pending, elevated BNP   Suicidal ideations, resolved   - psychiatry evaluation - dementia ,ruled out infectious causes   Left shoulder pain with h/o frequent falls  - xray to r/o fracture  - lidocaine patch  - check orthostatics, vit D level  Mild leukocytosis h/o bladder cancer as per daughter  1.9 cm lateral RIGHT renal inter-/lower pole higher than fluid  attenuation lesion, indeterminate.  - us kidney for better evaluation  - renal function wnl, CRP 18   - check urine and blood cultures   Chronic back pain   - c/w duloxetine  HTN   - c/w BB, HTCZ stopped ( increases risk of fall in elderly )     Advance directives   DNR/DNI/no feeding tubes   [phone] HCP daughter Elisa Wood

## 2022-05-28 NOTE — PROGRESS NOTE ADULT - SUBJECTIVE AND OBJECTIVE BOX
Subjective:  Patient is a 87y old  Male who presents with a chief complaint of AMS /  Delirium /  Suicidal Ideations (27 May 2022 11:17)    HPI:    Patient is a 86 y/o/m with history of dementia, that was admitted in the hospital  with suicidal ideations. Patient denies any HA, CP, SOB. No fevers, chills or shakes. Labs and vitals reviewed. This being said, according to the patient's ED note -  patient had stated to the daughter out of context that he wanted to kill himself. Patient explained to me that he was joking. Currently, patient denies any suicidal ideations.     Patient is limited historian, but does describe me what happened. Unable to recall what medical problems he has. Patient's family phone number not in the chart to obtain further history or to have a accurate GOC discussions.    Addendum:   Care discussed with patient's daughter. Since  -  patient has been having a significant cognitive decline. Has been in Nursing home facility (7.5 weeks).  [facility] - Memory care - patient is having a very hard time adjusting to the facility (patient is sitting in a same place all the time /  dementia patients around). Care discussed with Social work -  as patient does not needs to be in the hospital and needs placement. Care discussed with patient's daughter isha and social work.      (27 May 2022 11:17)         Patient seen and examined at bedside earlier today,     Review of system- Rest of the review of system are negative except mentioned in HPI    Objective: Vital sings reviewed for last 24 h  T(C): 36.5 (22 @ 08:28), Max: 36.9 (22 @ 21:26)  HR: 57 (22 @ 08:28) (57 - 80)  BP: 152/84 (22 @ 08:28) (149/77 - 157/86)  RR: 18 (22 @ 08:28) (18 - 18)  SpO2: 95% (22 @ 08:28) (95% - 97%)  Wt(kg): --  Daily     Daily   CAPILLARY BLOOD GLUCOSE          Physical exam:   General : NAD, appear to be of stated age , well groomed   NERVOUS SYSTEM:  Alert & Oriented X3, non- focal exam, Motor Strength 5/5 B/L upper and lower extremities; DTRs 2+ intact and symmetric  HEAD:  Atraumatic, Normocephalic  EYES: EOMI, PERRLA, conjunctiva and sclera clear  HEENT: Moist mucous membranes, Supple neck , No JVD  CHEST: Clear to auscultation bilaterally; No rales, no rhonchi, no wheezing  HEART: Regular rate and rhythm; No murmurs, no rubs or gallops  ABDOMEN: Soft, Non-tender, Non-distended; Bowel sounds present, no guarding , no peritoneal irritation   GENITOURINARY- Voiding, no suprapubic tenderness  EXTREMITIES:  2+ Peripheral Pulses, No clubbing, cyanosis,   edema  MUSCULOSKELETAL:- No muscle tenderness, Muscle tone normal, No joint tenderness, no Joint swelling,  Joint ROM –normal   SKIN-no rash, no lesion    LABS: all reviewed                        13.0   13.50 )-----------( 281      ( 28 May 2022 07:00 )             40.9     05-28    143  |  109<H>  |  23  ----------------------------<  119<H>  3.7   |  28  |  0.71    Ca    8.9      28 May 2022 07:00    TPro  6.3  /  Alb  3.1<L>  /  TBili  0.3  /  DBili  x   /  AST  17  /  ALT  21  /  AlkPhos  85  05-    PT/INR - ( 26 May 2022 22:33 )   PT: 13.2 sec;   INR: 1.14 ratio         PTT - ( 26 May 2022 22:33 )  PTT:33.8 sec        Urinalysis Basic - ( 26 May 2022 22:24 )    Color: Pale Yellow / Appearance: Clear / S.020 / pH: x  Gluc: x / Ketone: Negative  / Bili: Negative / Urobili: Negative   Blood: x / Protein: 30 mg/dL / Nitrite: Negative   Leuk Esterase: Negative / RBC: 0-2 /HPF / WBC Negative   Sq Epi: x / Non Sq Epi: Negative / Bacteria: Occasional          RECENT CULTURES:    RADIOLOGY & ADDITIONAL TESTS: all reviewed   EKG  reviewed   Current medications:  acetaminophen     Tablet .. 650 milliGRAM(s) Oral every 6 hours PRN  aluminum hydroxide/magnesium hydroxide/simethicone Suspension 30 milliLiter(s) Oral every 4 hours PRN  aspirin enteric coated 81 milliGRAM(s) Oral daily  digoxin     Tablet 125 MICROGram(s) Oral daily  DULoxetine 60 milliGRAM(s) Oral daily  melatonin 3 milliGRAM(s) Oral at bedtime PRN  metoprolol succinate ER 50 milliGRAM(s) Oral daily  ondansetron Injectable 4 milliGRAM(s) IV Push every 8 hours PRN  pantoprazole    Tablet 40 milliGRAM(s) Oral before breakfast  simvastatin 20 milliGRAM(s) Oral at bedtime             Subjective:  Patient is a 87y old  Male who presents with a chief complaint of AMS /  Delirium /  Suicidal Ideations      HPI:      88 y/o/m with PMH of Dementia, Paroxysmal A fib on ASA 81 only, CAD , h/o bladder cancer, HLD, HTN, macular degeneration, hearing loss, chronic back pain admitted to the hospital  on  with suicidal ideations. according to the patient's ED note -  patient had stated to the daughter out of context that he wanted to kill himself. Patient explained to me that he was joking. Currently, patient denies any suicidal ideations. Patient is limited historian, but does describe me what happened. Unable to recall what medical problems he has.  as per daughter. Since  -  patient has been having a significant cognitive decline. Has been in Nursing home facility (7.5 weeks).  [facility] - Memory care - patient is having a very hard time adjusting to the facility (patient is sitting in a same place all the time /  dementia patients around).      -  Patient seen and examined at bedside earlier today, left shoulder pain stiffness , unable to lift left arm  due to pain , denies cp, dyspnea, abdominal pain, heard to hear , poor historian     Review of system- Rest of the review of system are negative except mentioned in HPI    Objective: Vital sings reviewed for last 24 h  T(C): 36.5 (22 @ 08:28), Max: 36.9 (22 @ 21:26)  HR: 57 (22 @ 08:28) (57 - 80)  BP: 152/84 (22 @ 08:28) (149/77 - 157/86)  RR: 18 (22 @ 08:28) (18 - 18)  SpO2: 95% (22 @ 08:28) (95% - 97%)  Wt(kg): --  Daily     Daily   CAPILLARY BLOOD GLUCOSE    Physical exam:   General : NAD, appear to be of stated age , well groomed   NERVOUS SYSTEM:  Alert & Oriented X2, non- focal exam, Motor Strength 5/5 B/L upper and lower extremities; DTRs 2+ intact and symmetric, LUE limited ROM due to pain in left shoulder  HEAD:  Atraumatic, Normocephalic  EYES: EOMI, PERRLA, conjunctiva and sclera clear  HEENT: Moist mucous membranes, Supple neck , No JVD  CHEST: Clear to auscultation bilaterally; No rales, no rhonchi, no wheezing  HEART: Regular rate and rhythm; No murmurs, no rubs or gallops  ABDOMEN: Soft, Non-tender, Non-distended; Bowel sounds present, no guarding , no peritoneal irritation   GENITOURINARY- Voiding, no suprapubic tenderness  EXTREMITIES:  2+ Peripheral Pulses, No clubbing, cyanosis,   edema  MUSCULOSKELETAL:- No muscle tenderness, Muscle tone normal, No joint tenderness, no Joint swelling,  Joint ROM –normal   SKIN-no rash, no lesion    LABS: all reviewed                        13.0   13.50 )-----------( 281      ( 28 May 2022 07:00 )             40.9         143  |  109<H>  |  23  ----------------------------<  119<H>  3.7   |  28  |  0.71    Ca    8.9      28 May 2022 07:00    TPro  6.3  /  Alb  3.1<L>  /  TBili  0.3  /  DBili  x   /  AST  17  /  ALT  21  /  AlkPhos  85      PT/INR - ( 26 May 2022 22:33 )   PT: 13.2 sec;   INR: 1.14 ratio         PTT - ( 26 May 2022 22:33 )  PTT:33.8 sec        Urinalysis Basic - ( 26 May 2022 22:24 )    Color: Pale Yellow / Appearance: Clear / S.020 / pH: x  Gluc: x / Ketone: Negative  / Bili: Negative / Urobili: Negative   Blood: x / Protein: 30 mg/dL / Nitrite: Negative   Leuk Esterase: Negative / RBC: 0-2 /HPF / WBC Negative   Sq Epi: x / Non Sq Epi: Negative / Bacteria: Occasional      RECENT CULTURES:    RADIOLOGY & ADDITIONAL TESTS: all reviewed   EKG  reviewed - A fib   12 Lead ECG (22 @ 20:35) >  Ventricular Rate 71 BPM  QTC Calculation(Bazett) 415 ms  Diagnosis Line Atrial fibrillation with premature ventricular or aberrantly conducted complexes  Possible Anterior infarct , age undetermined  Abnormal ECG  No previous ECGs available    CT Abdomen and Pelvis w/ Oral Cont (22 @ 14:34) >    IMPRESSION:  Limited noncontrast study.    A 1.9 cm lateral RIGHT renal inter-/lower pole higher than fluid   attenuation lesion, indeterminate. More definitive characterization may   be obtained withtargeted ultrasound or contrast-enhanced renal protocol   MRI on a nonemergent basis.    CT Head No Cont (22 @ 04:27) >    There is no acute intracranial mass-effect, hemorrhage, midline shift, or   abnormal extra-axial fluid collection.    Chronic bilateral lacunar infarctions are present. Patchy and confluent   regions of periventricular and deep cerebral white matter hypoattenuation   due to chronic microangiopathic ischemic changes. Atheromatous   calcifications along the carotid siphons are present.    Mild centrally predominant cerebral volume loss noted.  No evidence of   hydrocephalus. Basal cisterns are patent.    Visualized paranasal sinuses  and mastoid air cells are clear. Calvarium   is intact.    IMPRESSION:    No acute intracranial bleeding.    Volume loss, chronic microvascular ischemic changes, and chronic lacunar   infarctions.        Current medications:  acetaminophen     Tablet .. 650 milliGRAM(s) Oral every 6 hours PRN  aluminum hydroxide/magnesium hydroxide/simethicone Suspension 30 milliLiter(s) Oral every 4 hours PRN  aspirin enteric coated 81 milliGRAM(s) Oral daily  digoxin     Tablet 125 MICROGram(s) Oral daily  DULoxetine 60 milliGRAM(s) Oral daily  melatonin 3 milliGRAM(s) Oral at bedtime PRN  metoprolol succinate ER 50 milliGRAM(s) Oral daily  ondansetron Injectable 4 milliGRAM(s) IV Push every 8 hours PRN  pantoprazole    Tablet 40 milliGRAM(s) Oral before breakfast  simvastatin 20 milliGRAM(s) Oral at bedtime

## 2022-05-29 LAB
A1C WITH ESTIMATED AVERAGE GLUCOSE RESULT: 6.2 % — HIGH (ref 4–5.6)
ALBUMIN SERPL ELPH-MCNC: 2.9 G/DL — LOW (ref 3.3–5)
ALP SERPL-CCNC: 77 U/L — SIGNIFICANT CHANGE UP (ref 40–120)
ALT FLD-CCNC: 19 U/L — SIGNIFICANT CHANGE UP (ref 12–78)
AMMONIA BLD-MCNC: 42 UMOL/L — HIGH (ref 11–32)
ANION GAP SERPL CALC-SCNC: 4 MMOL/L — LOW (ref 5–17)
AST SERPL-CCNC: 13 U/L — LOW (ref 15–37)
BASOPHILS # BLD AUTO: 0.03 K/UL — SIGNIFICANT CHANGE UP (ref 0–0.2)
BASOPHILS NFR BLD AUTO: 0.2 % — SIGNIFICANT CHANGE UP (ref 0–2)
BILIRUB SERPL-MCNC: 0.4 MG/DL — SIGNIFICANT CHANGE UP (ref 0.2–1.2)
BUN SERPL-MCNC: 23 MG/DL — SIGNIFICANT CHANGE UP (ref 7–23)
CALCIUM SERPL-MCNC: 8.6 MG/DL — SIGNIFICANT CHANGE UP (ref 8.5–10.1)
CHLORIDE SERPL-SCNC: 106 MMOL/L — SIGNIFICANT CHANGE UP (ref 96–108)
CHOLEST SERPL-MCNC: 130 MG/DL — SIGNIFICANT CHANGE UP
CO2 SERPL-SCNC: 31 MMOL/L — SIGNIFICANT CHANGE UP (ref 22–31)
CREAT SERPL-MCNC: 0.76 MG/DL — SIGNIFICANT CHANGE UP (ref 0.5–1.3)
CRP SERPL-MCNC: 19 MG/L — HIGH
CULTURE RESULTS: SIGNIFICANT CHANGE UP
EGFR: 88 ML/MIN/1.73M2 — SIGNIFICANT CHANGE UP
EOSINOPHIL # BLD AUTO: 0.39 K/UL — SIGNIFICANT CHANGE UP (ref 0–0.5)
EOSINOPHIL NFR BLD AUTO: 3.1 % — SIGNIFICANT CHANGE UP (ref 0–6)
ESTIMATED AVERAGE GLUCOSE: 131 MG/DL — HIGH (ref 68–114)
GLUCOSE SERPL-MCNC: 104 MG/DL — HIGH (ref 70–99)
HCT VFR BLD CALC: 39.6 % — SIGNIFICANT CHANGE UP (ref 39–50)
HDLC SERPL-MCNC: 28 MG/DL — LOW
HGB BLD-MCNC: 12.6 G/DL — LOW (ref 13–17)
IMM GRANULOCYTES NFR BLD AUTO: 0.3 % — SIGNIFICANT CHANGE UP (ref 0–1.5)
LACTATE SERPL-SCNC: 0.7 MMOL/L — SIGNIFICANT CHANGE UP (ref 0.7–2)
LIPID PNL WITH DIRECT LDL SERPL: 80 MG/DL — SIGNIFICANT CHANGE UP
LYMPHOCYTES # BLD AUTO: 1.87 K/UL — SIGNIFICANT CHANGE UP (ref 1–3.3)
LYMPHOCYTES # BLD AUTO: 14.9 % — SIGNIFICANT CHANGE UP (ref 13–44)
MCHC RBC-ENTMCNC: 26.3 PG — LOW (ref 27–34)
MCHC RBC-ENTMCNC: 31.8 GM/DL — LOW (ref 32–36)
MCV RBC AUTO: 82.7 FL — SIGNIFICANT CHANGE UP (ref 80–100)
MONOCYTES # BLD AUTO: 0.86 K/UL — SIGNIFICANT CHANGE UP (ref 0–0.9)
MONOCYTES NFR BLD AUTO: 6.9 % — SIGNIFICANT CHANGE UP (ref 2–14)
NEUTROPHILS # BLD AUTO: 9.36 K/UL — HIGH (ref 1.8–7.4)
NEUTROPHILS NFR BLD AUTO: 74.6 % — SIGNIFICANT CHANGE UP (ref 43–77)
NON HDL CHOLESTEROL: 102 MG/DL — SIGNIFICANT CHANGE UP
PLATELET # BLD AUTO: 273 K/UL — SIGNIFICANT CHANGE UP (ref 150–400)
POTASSIUM SERPL-MCNC: 3.5 MMOL/L — SIGNIFICANT CHANGE UP (ref 3.5–5.3)
POTASSIUM SERPL-SCNC: 3.5 MMOL/L — SIGNIFICANT CHANGE UP (ref 3.5–5.3)
PROT SERPL-MCNC: 6 GM/DL — SIGNIFICANT CHANGE UP (ref 6–8.3)
RBC # BLD: 4.79 M/UL — SIGNIFICANT CHANGE UP (ref 4.2–5.8)
RBC # FLD: 14.6 % — HIGH (ref 10.3–14.5)
SODIUM SERPL-SCNC: 141 MMOL/L — SIGNIFICANT CHANGE UP (ref 135–145)
SPECIMEN SOURCE: SIGNIFICANT CHANGE UP
TRIGL SERPL-MCNC: 108 MG/DL — SIGNIFICANT CHANGE UP
WBC # BLD: 12.55 K/UL — HIGH (ref 3.8–10.5)
WBC # FLD AUTO: 12.55 K/UL — HIGH (ref 3.8–10.5)

## 2022-05-29 PROCEDURE — 99232 SBSQ HOSP IP/OBS MODERATE 35: CPT

## 2022-05-29 PROCEDURE — 99223 1ST HOSP IP/OBS HIGH 75: CPT

## 2022-05-29 RX ORDER — ASPIRIN/CALCIUM CARB/MAGNESIUM 324 MG
1 TABLET ORAL
Qty: 0 | Refills: 0 | DISCHARGE

## 2022-05-29 RX ORDER — SIMVASTATIN 20 MG/1
1 TABLET, FILM COATED ORAL
Qty: 0 | Refills: 0 | DISCHARGE

## 2022-05-29 RX ORDER — LANOLIN ALCOHOL/MO/W.PET/CERES
1 CREAM (GRAM) TOPICAL
Qty: 0 | Refills: 0 | DISCHARGE

## 2022-05-29 RX ORDER — LIDOCAINE 4 G/100G
2 CREAM TOPICAL DAILY
Refills: 0 | Status: DISCONTINUED | OUTPATIENT
Start: 2022-05-29 | End: 2022-05-30

## 2022-05-29 RX ORDER — METOPROLOL TARTRATE 50 MG
1 TABLET ORAL
Qty: 30 | Refills: 0
Start: 2022-05-29 | End: 2022-06-27

## 2022-05-29 RX ORDER — LACTULOSE 10 G/15ML
10 SOLUTION ORAL ONCE
Refills: 0 | Status: COMPLETED | OUTPATIENT
Start: 2022-05-29 | End: 2022-05-29

## 2022-05-29 RX ORDER — SIMVASTATIN 20 MG/1
1 TABLET, FILM COATED ORAL
Qty: 30 | Refills: 0
Start: 2022-05-29 | End: 2022-06-27

## 2022-05-29 RX ORDER — DULOXETINE HYDROCHLORIDE 30 MG/1
1 CAPSULE, DELAYED RELEASE ORAL
Qty: 0 | Refills: 0 | DISCHARGE

## 2022-05-29 RX ORDER — ASPIRIN/CALCIUM CARB/MAGNESIUM 324 MG
1 TABLET ORAL
Qty: 30 | Refills: 0
Start: 2022-05-29 | End: 2022-06-27

## 2022-05-29 RX ORDER — LIDOCAINE 4 G/100G
2 CREAM TOPICAL
Qty: 2 | Refills: 0
Start: 2022-05-29 | End: 2022-06-27

## 2022-05-29 RX ORDER — METOPROLOL TARTRATE 50 MG
3 TABLET ORAL
Qty: 0 | Refills: 0 | DISCHARGE

## 2022-05-29 RX ORDER — PANTOPRAZOLE SODIUM 20 MG/1
1 TABLET, DELAYED RELEASE ORAL
Qty: 30 | Refills: 0
Start: 2022-05-29 | End: 2022-06-27

## 2022-05-29 RX ORDER — DEXLANSOPRAZOLE 30 MG/1
1 CAPSULE, DELAYED RELEASE ORAL
Qty: 0 | Refills: 0 | DISCHARGE

## 2022-05-29 RX ORDER — ACETAMINOPHEN 500 MG
1 TABLET ORAL
Qty: 90 | Refills: 0
Start: 2022-05-29 | End: 2022-06-27

## 2022-05-29 RX ORDER — LANOLIN ALCOHOL/MO/W.PET/CERES
1 CREAM (GRAM) TOPICAL
Qty: 30 | Refills: 0
Start: 2022-05-29 | End: 2022-06-27

## 2022-05-29 RX ORDER — APIXABAN 2.5 MG/1
1 TABLET, FILM COATED ORAL
Qty: 60 | Refills: 0
Start: 2022-05-29 | End: 2022-06-27

## 2022-05-29 RX ORDER — DULOXETINE HYDROCHLORIDE 30 MG/1
1 CAPSULE, DELAYED RELEASE ORAL
Qty: 30 | Refills: 0
Start: 2022-05-29 | End: 2022-06-27

## 2022-05-29 RX ORDER — DORZOLAMIDE HYDROCHLORIDE TIMOLOL MALEATE 20; 5 MG/ML; MG/ML
1 SOLUTION/ DROPS OPHTHALMIC
Qty: 0 | Refills: 0 | DISCHARGE

## 2022-05-29 RX ORDER — GLUCOSAMINE HCL/CHONDROITIN SU 500-400 MG
1 CAPSULE ORAL
Qty: 0 | Refills: 0 | DISCHARGE

## 2022-05-29 RX ORDER — DIGOXIN 250 MCG
1 TABLET ORAL
Qty: 30 | Refills: 0
Start: 2022-05-29 | End: 2022-06-27

## 2022-05-29 RX ORDER — DOCUSATE SODIUM 100 MG
1 CAPSULE ORAL
Qty: 0 | Refills: 0 | DISCHARGE

## 2022-05-29 RX ORDER — APIXABAN 2.5 MG/1
5 TABLET, FILM COATED ORAL EVERY 12 HOURS
Refills: 0 | Status: DISCONTINUED | OUTPATIENT
Start: 2022-05-29 | End: 2022-05-30

## 2022-05-29 RX ORDER — DIGOXIN 250 MCG
1 TABLET ORAL
Qty: 0 | Refills: 0 | DISCHARGE

## 2022-05-29 RX ADMIN — PANTOPRAZOLE SODIUM 40 MILLIGRAM(S): 20 TABLET, DELAYED RELEASE ORAL at 09:20

## 2022-05-29 RX ADMIN — Medication 125 MICROGRAM(S): at 09:20

## 2022-05-29 RX ADMIN — LACTULOSE 10 GRAM(S): 10 SOLUTION ORAL at 09:19

## 2022-05-29 RX ADMIN — Medication 81 MILLIGRAM(S): at 09:20

## 2022-05-29 RX ADMIN — Medication 50 MILLIGRAM(S): at 09:20

## 2022-05-29 RX ADMIN — LIDOCAINE 2 PATCH: 4 CREAM TOPICAL at 22:34

## 2022-05-29 RX ADMIN — Medication 5 MILLIGRAM(S): at 22:33

## 2022-05-29 RX ADMIN — APIXABAN 5 MILLIGRAM(S): 2.5 TABLET, FILM COATED ORAL at 22:33

## 2022-05-29 RX ADMIN — APIXABAN 5 MILLIGRAM(S): 2.5 TABLET, FILM COATED ORAL at 09:20

## 2022-05-29 RX ADMIN — Medication 1 TABLET(S): at 09:20

## 2022-05-29 RX ADMIN — DULOXETINE HYDROCHLORIDE 60 MILLIGRAM(S): 30 CAPSULE, DELAYED RELEASE ORAL at 09:21

## 2022-05-29 RX ADMIN — SIMVASTATIN 20 MILLIGRAM(S): 20 TABLET, FILM COATED ORAL at 22:34

## 2022-05-29 NOTE — PROGRESS NOTE ADULT - SUBJECTIVE AND OBJECTIVE BOX
Subjective:  Patient is a 87y old  Male who presents with a chief complaint of AMS /  Delirium /  Suicidal Ideations      HPI:      88 y/o/m with PMH of Dementia, Paroxysmal A fib on ASA 81 only, CAD , h/o bladder cancer, HLD, HTN, macular degeneration, hearing loss, chronic back pain admitted to the hospital  on  with suicidal ideations. according to the patient's ED note -  patient had stated to the daughter out of context that he wanted to kill himself. Patient explained to me that he was joking. Currently, patient denies any suicidal ideations. Patient is limited historian, but does describe me what happened. Unable to recall what medical problems he has.  as per daughter. Since  -  patient has been having a significant cognitive decline. Has been in Nursing home facility (7.5 weeks).  [facility] - Memory care - patient is having a very hard time adjusting to the facility (patient is sitting in a same place all the time /  dementia patients around).      -  Patient seen and examined at bedside earlier today, left shoulder pain stiffness , unable to lift left arm  due to pain , denies cp, dyspnea, abdominal pain, heard to hear , poor historian    - pt seen and examined, afebrile , denies ha, cp, dyspnea, abdominal pain, tolerating po intake     Review of system- Rest of the review of system are negative except mentioned in HPI    Objective:   Vitals reviewed for last 24 hours  T(C): 36.5 (22 @ 09:13), Max: 36.9 (22 @ 21:05)  T(F): 97.7 (22 @ 09:13), Max: 98.4 (22 @ 21:05)  HR: 81 (22 @ 09:13) (72 - 81)  BP: 135/67 (22 @ 09:13) (132/87 - 149/77)  RR: 18 (22 @ 09:13) (18 - 18)  SpO2: 96% (22 @ 09:13) (94% - 97%)  Wt(kg): --    Physical exam:   General : NAD, appear to be of stated age , well groomed   NERVOUS SYSTEM:  Alert & Oriented X2, non- focal exam, Motor Strength 5/5 B/L upper and lower extremities; DTRs 2+ intact and symmetric, LUE limited ROM due to pain in left shoulder  HEAD:  Atraumatic, Normocephalic  EYES: EOMI, PERRLA, conjunctiva and sclera clear  HEENT: Moist mucous membranes, Supple neck , No JVD  CHEST: Clear to auscultation bilaterally; No rales, no rhonchi, no wheezing  HEART: Regular rate and rhythm; No murmurs, no rubs or gallops  ABDOMEN: Soft, Non-tender, Non-distended; Bowel sounds present, no guarding , no peritoneal irritation   GENITOURINARY- Voiding, no suprapubic tenderness  EXTREMITIES:  2+ Peripheral Pulses, No clubbing, cyanosis,   edema  MUSCULOSKELETAL:- No muscle tenderness, Muscle tone normal, No joint tenderness, no Joint swelling,  Joint ROM –normal   SKIN-no rash, no lesion    LABS: all reviewed      141  |  106  |  23  ----------------------------<  104<H>  3.5   |  31  |  0.76    Ca    8.6      29 May 2022 06:43    TPro  6.0  /  Alb  2.9<L>  /  TBili  0.4  /  DBili  x   /  AST  13<L>  /  ALT  19  /  AlkPhos  77                              12.6   12.55 )-----------( 273      ( 29 May 2022 06:43 )             39.6       CARDIAC MARKERS ( 28 May 2022 11:25 )  x     / x     / 66 U/L / x     / x            LIVER FUNCTIONS - ( 29 May 2022 06:43 )  Alb: 2.9 g/dL / Pro: 6.0 gm/dL / ALK PHOS: 77 U/L / ALT: 19 U/L / AST: 13 U/L / GGT: x                                         13.0   13.50 )-----------( 281      ( 28 May 2022 07:00 )             40.9         143  |  109<H>  |  23  ----------------------------<  119<H>  3.7   |  28  |  0.71    Ca    8.9      28 May 2022 07:00    TPro  6.3  /  Alb  3.1<L>  /  TBili  0.3  /  DBili  x   /  AST  17  /  ALT  21  /  AlkPhos  85      PT/INR - ( 26 May 2022 22:33 )   PT: 13.2 sec;   INR: 1.14 ratio         PTT - ( 26 May 2022 22:33 )  PTT:33.8 sec        Urinalysis Basic - ( 26 May 2022 22:24 )    Color: Pale Yellow / Appearance: Clear / S.020 / pH: x  Gluc: x / Ketone: Negative  / Bili: Negative / Urobili: Negative   Blood: x / Protein: 30 mg/dL / Nitrite: Negative   Leuk Esterase: Negative / RBC: 0-2 /HPF / WBC Negative   Sq Epi: x / Non Sq Epi: Negative / Bacteria: Occasional      RECENT CULTURES:    RADIOLOGY & ADDITIONAL TESTS: all reviewed   EKG  reviewed - A fib   12 Lead ECG (22 @ 20:35) >  Ventricular Rate 71 BPM  QTC Calculation(Bazett) 415 ms  Diagnosis Line Atrial fibrillation with premature ventricular or aberrantly conducted complexes  Possible Anterior infarct , age undetermined  Abnormal ECG  No previous ECGs available     Xray Shoulder 2 Views, Left (22 @ 10:23) >  Findings: There is no evidence of glenohumeral dislocation. No fracture.   No AC separation.  Adjacent ribs are unremarkable. Clear visualized lung   with no pneumothorax.    Impression: No fracture/dislocation     US Kidney and Bladder (22 @ 17:04) >  IMPRESSION:  Complex cystic right renal lesion, corresponding to the indeterminate   lesion seen on the prior CT. Consider further characterization with   contrast enhanced renal MRI.      CT Abdomen and Pelvis w/ Oral Cont (22 @ 14:34) >    IMPRESSION:  Limited noncontrast study.    A 1.9 cm lateral RIGHT renal inter-/lower pole higher than fluid   attenuation lesion, indeterminate. More definitive characterization may   be obtained withtargeted ultrasound or contrast-enhanced renal protocol   MRI on a nonemergent basis.    CT Head No Cont (22 @ 04:27) >    There is no acute intracranial mass-effect, hemorrhage, midline shift, or   abnormal extra-axial fluid collection.    Chronic bilateral lacunar infarctions are present. Patchy and confluent   regions of periventricular and deep cerebral white matter hypoattenuation   due to chronic microangiopathic ischemic changes. Atheromatous   calcifications along the carotid siphons are present.    Mild centrally predominant cerebral volume loss noted.  No evidence of   hydrocephalus. Basal cisterns are patent.    Visualized paranasal sinuses  and mastoid air cells are clear. Calvarium   is intact.    IMPRESSION:    No acute intracranial bleeding.    Volume loss, chronic microvascular ischemic changes, and chronic lacunar   infarctions.      MEDICATIONS  (STANDING):  apixaban 5 milliGRAM(s) Oral every 12 hours  aspirin enteric coated 81 milliGRAM(s) Oral daily  bisacodyl 5 milliGRAM(s) Oral at bedtime  digoxin     Tablet 125 MICROGram(s) Oral daily  DULoxetine 60 milliGRAM(s) Oral daily  lidocaine   4% Patch 2 Patch Transdermal daily  melatonin 5 milliGRAM(s) Oral at bedtime  metoprolol succinate ER 50 milliGRAM(s) Oral daily  multivitamin 1 Tablet(s) Oral daily  pantoprazole    Tablet 40 milliGRAM(s) Oral before breakfast  simvastatin 20 milliGRAM(s) Oral at bedtime    MEDICATIONS  (PRN):  acetaminophen     Tablet .. 650 milliGRAM(s) Oral every 6 hours PRN Temp greater or equal to 38C (100.4F), Mild Pain (1 - 3)  aluminum hydroxide/magnesium hydroxide/simethicone Suspension 30 milliLiter(s) Oral every 4 hours PRN Dyspepsia  ondansetron Injectable 4 milliGRAM(s) IV Push every 8 hours PRN Nausea and/or Vomiting

## 2022-05-29 NOTE — CONSULT NOTE ADULT - SUBJECTIVE AND OBJECTIVE BOX
HPI:    Patient is a 86 y/o/m with history of dementia, that was admitted in the hospital 5/26 with suicidal ideations. Patient denies any HA, CP, SOB. No fevers, chills or shakes. Labs and vitals reviewed. This being said, according to the patient's ED note -  patient had stated to the daughter out of context that he wanted to kill himself. Patient explained to me that he was joking. Currently, patient denies any suicidal ideations.     Patient is limited historian, but does describe me what happened. Unable to recall what medical problems he has. Patient's family phone number not in the chart to obtain further history or to have a accurate GOC discussions.    Addendum:   Care discussed with patient's daughter. Since easter -  patient has been having a significant cognitive decline. Has been in Nursing home facility (7.5 weeks).  [facility] - Memory care - patient is having a very hard time adjusting to the facility (patient is sitting in a same place all the time /  dementia patients around). Care discussed with Social work -  as patient does not needs to be in the hospital and needs placement. Care discussed with patient's daughter isha and social work.      (27 May 2022 11:17)      Patient is a 87y old  Male who presents with a chief complaint of AMS /  Delirium /  Suicidal Ideations (29 May 2022 12:15)      Consulted by Dr. Ashley Dickerson  for VTE prophylaxis, risk stratification, and anticoagulation management.    PAST MEDICAL & SURGICAL HISTORY:  Bladder cancer      HTN (hypertension)      Hyperlipidemia      Paroxysmal atrial fibrillation on 81mg asa      Dementia          FAMILY HISTORY:      Interval Note: Pt seen at bedside on 1north.  Discussed the use of Eliquis as hs anticoagulation medication.  pt is unable to comprehend.  Spoke with Dr. Dickerson and she states she has already informed pt's daughter of the use of Eliquis with benefits and risks.         IMPROVE VTE Individual Risk Assessment    RISK                                                                Points    [  ] Previous VTE                                                  3    [  ] Thrombophilia                                               2    [  ] Lower limb paralysis                                      2        (unable to hold up >15 seconds)      [  ] Current Cancer                                              2         (within 6 months)    [  ] Immobilization > 24 hrs                                1    [  ] ICU/CCU stay > 24 hours                              1    [  x] Age > 60                                                      1    IMPROVE VTE Score _____1____    IMPROVE Score 0-1: Low Risk, No VTE prophylaxis required for most patients, encourage ambulation.   IMPROVE Score 2-3: At risk, pharmacologic VTE prophylaxis is indicated for most patients (in the absence of a contraindication)  IMPROVE Score > or = 4: High Risk, pharmacologic VTE prophylaxis is indicated for most patients (in the absence of a contraindication)    [id number] Score: 6    IMPROVE Bleeding Risk Score: 3.5    Falls Risk:   High (  )  Mod (x  )  Low (  )  crcl: 104.8        cr:.64          BMI 31                Denies any personal or familial history of clotting or bleeding disorders.    Allergies    No Known Allergies    Intolerances        REVIEW OF SYSTEMS    (  )Fever	     (  )Constipation	(  )SOB				(  )Headache	(  )Dysuria  (  )Chills	     (  )Melena	(  )Dyspnea present on exertion	                    (  )Dizziness                    (  )Polyuria  (  )Nausea	     (  )Hematochezia	(  )Cough			                    (  )Syncope   	(  )Hematuria  (  )Vomiting    (  )Chest Pain	(  )Wheezing			(  )Weakness  (  )Diarrhea     (  )Palpitations	(  )Anorexia			( x )Myalgia    Pertinent positives in HPI and daily subjective.  All other ROS negative.      Vital Signs Last 24 Hrs  T(C): 36.5 (29 May 2022 09:13), Max: 36.9 (28 May 2022 21:05)  T(F): 97.7 (29 May 2022 09:13), Max: 98.4 (28 May 2022 21:05)  HR: 81 (29 May 2022 09:13) (72 - 81)  BP: 135/67 (29 May 2022 09:13) (132/87 - 149/77)  BP(mean): --  RR: 18 (29 May 2022 09:13) (18 - 18)  SpO2: 96% (29 May 2022 09:13) (94% - 97%)    PHYSICAL EXAM:    Constitutional: Appears Well    Neurological: A& O x 2 person and place but does not know why he is in the hosptial, thinks he lives with his daughter,    Skin: Warm    Respiratory and Thorax: normal effort; Breath sounds: normal; No rales/wheezing/rhonchi  	  Cardiovascular: S1, S2, regular, NMBR	    Gastrointestinal: BS + x 4Q, nontender	    Genitourinary:  Bladder nondistended, nontender    Musculoskeletal:   General Right:   no muscle/joint tenderness,   normal tone, no joint swelling,   ROM: full	    General Left:   no muscle/joint tenderness,   normal tone, no joint swelling,   ROM: full    Lower extrems:   Right: no calf tenderness              negative medardo's sign               + pedal pulses    Left:   no calf tenderness              negative medardo's sign               + pedal pulses                          12.6   12.55 )-----------( 273      ( 29 May 2022 06:43 )             39.6       05-29    141  |  106  |  23  ----------------------------<  104<H>  3.5   |  31  |  0.76    Ca    8.6      29 May 2022 06:43    TPro  6.0  /  Alb  2.9<L>  /  TBili  0.4  /  DBili  x   /  AST  13<L>  /  ALT  19  /  AlkPhos  77  05-29    < from: CT Head No Cont (05.27.22 @ 04:27) >  IMPRESSION:    No acute intracranial bleeding.    Volume loss, chronic microvascular ischemic changes, and chronic lacunar   infarctions.    < end of copied text >    				    MEDICATIONS  (STANDING):  apixaban 5 milliGRAM(s) Oral every 12 hours  aspirin enteric coated 81 milliGRAM(s) Oral daily  bisacodyl 5 milliGRAM(s) Oral at bedtime  digoxin     Tablet 125 MICROGram(s) Oral daily  DULoxetine 60 milliGRAM(s) Oral daily  lidocaine   4% Patch 2 Patch Transdermal daily  melatonin 5 milliGRAM(s) Oral at bedtime  metoprolol succinate ER 50 milliGRAM(s) Oral daily  multivitamin 1 Tablet(s) Oral daily  pantoprazole    Tablet 40 milliGRAM(s) Oral before breakfast  simvastatin 20 milliGRAM(s) Oral at bedtime          DVT Prophylaxis:  LMWH                   (  )  Heparin SQ           (  )  Coumadin             (  )  Xarelto                  (  )  Eliquis                   ( x )  Venodynes           ( x )  Ambulation          ( x )  UFH                       (  )  Contraindicated  (  )  EC Aspirin             (  )

## 2022-05-29 NOTE — CONSULT NOTE ADULT - SUBJECTIVE AND OBJECTIVE BOX
Cardiology and heart failure attending.   Patient is a 87y old  Male who presents with a chief complaint of AMS /  Delirium /  Suicidal Ideations.       HPI:    Patient is a 88 y/o/m with history of dementia, that was admitted in the hospital 5/26 with suicidal ideations. Patient denies any HA, CP, SOB now lying flat in bed.   PAST MEDICAL & SURGICAL HISTORY:  Bladder cancer      HTN (hypertension)      Hyperlipidemia      Paroxysmal atrial fibrillation      Dementia          MEDICATIONS  (STANDING):  aspirin enteric coated 81 milliGRAM(s) Oral daily  bisacodyl 5 milliGRAM(s) Oral at bedtime  digoxin     Tablet 125 MICROGram(s) Oral daily  DULoxetine 60 milliGRAM(s) Oral daily  melatonin 5 milliGRAM(s) Oral at bedtime  metoprolol succinate ER 50 milliGRAM(s) Oral daily  multivitamin 1 Tablet(s) Oral daily  pantoprazole    Tablet 40 milliGRAM(s) Oral before breakfast  simvastatin 20 milliGRAM(s) Oral at bedtime    MEDICATIONS  (PRN):  acetaminophen     Tablet .. 650 milliGRAM(s) Oral every 6 hours PRN Temp greater or equal to 38C (100.4F), Mild Pain (1 - 3)  aluminum hydroxide/magnesium hydroxide/simethicone Suspension 30 milliLiter(s) Oral every 4 hours PRN Dyspepsia  ondansetron Injectable 4 milliGRAM(s) IV Push every 8 hours PRN Nausea and/or Vomiting      FAMILY HISTORY:   No family history of premature CAD or SCD.     SOCIAL HISTORY: no recent smoking     REVIEW OF SYSTEMS:  CONSTITUTIONAL:    No fatigue, malaise, lethargy.  No fever or chills.  RESPIRATORY:  No cough.  No wheeze.  No hemoptysis.  No shortness of breath.  CARDIOVASCULAR:  No chest pains.  No palpitations. No shortness of breath, No orthopnea or PND.  GASTROINTESTINAL:  No abdominal pain.  No nausea or vomiting.    GENITOURINARY:    No hematuria.    MUSCULOSKELETAL:  No musculoskeletal pain.  No joint swelling.  No arthritis.  NEUROLOGICAL:  No tingling or numbness or weakness.  PSYCHIATRIC:  No confusion  SKIN:  No rashes.            Vital Signs Last 24 Hrs  T(C): 36.3 (29 May 2022 05:54), Max: 36.9 (28 May 2022 21:05)  T(F): 97.4 (29 May 2022 05:54), Max: 98.4 (28 May 2022 21:05)  HR: 79 (29 May 2022 05:54) (57 - 79)  BP: 149/77 (29 May 2022 05:54) (132/87 - 152/84)  BP(mean): --  RR: 18 (28 May 2022 21:05) (18 - 18)  SpO2: 94% (29 May 2022 05:54) (94% - 97%)    PHYSICAL EXAM-    Constitutional: eldelry obese male. no distress    Head: Head is normocephalic and atraumatic.      Neck:  No JVD.     Cardiovascular: Regular rate and rhythm without S3, S4. No murmurs or rubs are appreciated.      Respiratory: Breathsounds are normal. No rales. No wheezing.    Abdomen: Soft, nontender, nondistended with positive bowel sounds.      Extremity: No tenderness. No  pitting edema     Neurologic: The patient is alert and oriented.      Skin: No rash, no obvious lesions noted.      Psychiatric: The patient appears to be emotionally stable.      INTERPRETATION OF TELEMETRY: not on     ECG: afib, isolated PVCs      I&O's Detail    28 May 2022 07:01  -  29 May 2022 06:49  --------------------------------------------------------  IN:  Total IN: 0 mL    OUT:    Voided (mL): 150 mL  Total OUT: 150 mL    Total NET: -150 mL          LABS:                        13.0   13.50 )-----------( 281      ( 28 May 2022 07:00 )             40.9     05-28    143  |  109<H>  |  23  ----------------------------<  119<H>  3.7   |  28  |  0.71    Ca    8.9      28 May 2022 07:00      CARDIAC MARKERS ( 28 May 2022 11:25 )  x     / x     / 66 U/L / x     / x              I&O's Summary    28 May 2022 07:01  -  29 May 2022 06:49  --------------------------------------------------------  IN: 0 mL / OUT: 150 mL / NET: -150 mL      BNPSerum Pro-Brain Natriuretic Peptide: 1719 pg/mL (05-28 @ 11:25)    RADIOLOGY & ADDITIONAL STUDIES:

## 2022-05-29 NOTE — PROGRESS NOTE ADULT - ASSESSMENT
88 y/o/m with PMH of Dementia, Paroxysmal A fib on ASA 81 only, CAD , h/o bladder cancer, HLD, HTN, macular degeneration, hearing loss, chronic back pain admitted to the hospital  on 5/27 with suicidal ideations.    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 Volume loss, chronic microvascular ischemic changes, and chronic lacunar   infarctions.  - TSH wnl, check B12, folate , ammonia   - patient would benefit from AC given lacunar infarction and A fib on EKG   - cardiology consult for recommendations- d/w Dr. Villanueva - recs AC with eliquis  - c/w ASA 18 , BB, digoxin ,  statins, troponin x 1 neg, BNP elevated 1719  - 2 d echo  - pending, elevated BNP   Suicidal ideations, resolved   - psychiatry evaluation - dementia ,ruled out infectious causes   Left shoulder pain with h/o frequent falls ruled out Fractures   - xray - no fracture  - lidocaine patch  - orthostatics - pending , vit D level 55   Mild leukocytosis h/o bladder cancer as per daughter  1.9 cm lateral RIGHT renal inter-/lower pole higher than fluid  attenuation lesion, indeterminate.  - us kidney for better evaluation  - renal function wnl, CRP 18   - check urine and blood cultures   Chronic back pain - c/w duloxetine  HTN - c/w BB, HTCZ stopped ( increases risk of fall in elderly )     Advance directives   DNR/DNI/no feeding tubes   [phone] HCP daughter Elisa Wood updated 5/28,5/29     Dispo - awaiting echo, d/c planning for [facility]       86 y/o/m with PMH of Dementia, Paroxysmal A fib on ASA 81 only, CAD , h/o bladder cancer, HLD, HTN, macular degeneration, hearing loss, chronic back pain admitted to the hospital  on 5/27 with suicidal ideations.    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 Volume loss, chronic microvascular ischemic changes, and chronic lacunar   infarctions.  - TSH wnl, check B12, folate , ammonia - mildly elevated   - patient would benefit from AC given lacunar infarction and A fib on EKG   - cardiology consult for recommendations- d/w Dr. Villanueva - recs AC with eliquis  - c/w ASA 18 , BB, digoxin ,  statins, troponin x 1 neg, BNP elevated 1719  - 2 d echo  - pending, elevated BNP   - 5/29 - lactulose 10 gm , repeat ammonia in am  Suicidal ideations, resolved   - psychiatry evaluation - dementia ,ruled out infectious causes   Left shoulder pain with h/o frequent falls ruled out Fractures   - xray - no fracture  - lidocaine patch  - orthostatics - pending , vit D level 55   Mild leukocytosis h/o bladder cancer as per daughter  1.9 cm lateral RIGHT renal inter-/lower pole higher than fluid  attenuation lesion, indeterminate.  - us kidney for better evaluation  - renal function wnl, CRP 18   - check urine and blood cultures   Chronic back pain - c/w duloxetine  HTN - c/w BB, HTCZ stopped ( increases risk of fall in elderly )     Advance directives   DNR/DNI/no feeding tubes   [phone] HCP daughter Elisa Wood updated 5/28,5/29     Dispo - awaiting echo, d/c planning for [facility]

## 2022-05-29 NOTE — CONSULT NOTE ADULT - ASSESSMENT
Atrial fibrillation- rate controlled.  on ASA as outpt.  NOt on full dose AC as outpt.  Multiple comorbidities including severe dementia and falls.  if no contraindications exist can increase ASA to 325mg daily   continue home meds for rate control.    HTN - BP stable.  continue home meds.    Hyperlipidemia- on statin.    Other medical issues- Management per primary team.   Thank you for allowing me to participate in the care of this patient. Please feel free to contact me with any questions.

## 2022-05-29 NOTE — CONSULT NOTE ADULT - ASSESSMENT
This is a 87 year old male with hx of A. Fib only on asa 81mg daily,[id number] Score: 6, chronic lacunar infarctions,  presented to [facility] for suicidal ideations. Pt dose not recall stating he wants to kill himself.  Pt has high thrombotic risks and high falls risks.  Pt can be on an oral ac as long as he is in a supervised setting.    5-: Discussed with Dr. Dickerson and she states pt is in consistent A. Fib.  She agrees with the of Eliquis in a supervised setting and she is agreeable with plan      plan:  Start pt on eliquis 5mg twice a day  :monitor for falls ad bleeding  :Daily cbc  :le venodynes  :oob as tolerated  Thanks for consult will f/u

## 2022-05-30 ENCOUNTER — TRANSCRIPTION ENCOUNTER (OUTPATIENT)
Age: 87
End: 2022-05-30

## 2022-05-30 LAB
AMMONIA BLD-MCNC: 22 UMOL/L — SIGNIFICANT CHANGE UP (ref 11–32)
ANION GAP SERPL CALC-SCNC: 3 MMOL/L — LOW (ref 5–17)
BUN SERPL-MCNC: 22 MG/DL — SIGNIFICANT CHANGE UP (ref 7–23)
CALCIUM SERPL-MCNC: 8.7 MG/DL — SIGNIFICANT CHANGE UP (ref 8.5–10.1)
CHLORIDE SERPL-SCNC: 109 MMOL/L — HIGH (ref 96–108)
CO2 SERPL-SCNC: 32 MMOL/L — HIGH (ref 22–31)
CREAT SERPL-MCNC: 0.8 MG/DL — SIGNIFICANT CHANGE UP (ref 0.5–1.3)
EGFR: 86 ML/MIN/1.73M2 — SIGNIFICANT CHANGE UP
GLUCOSE SERPL-MCNC: 113 MG/DL — HIGH (ref 70–99)
HCT VFR BLD CALC: 40.1 % — SIGNIFICANT CHANGE UP (ref 39–50)
HGB BLD-MCNC: 12.5 G/DL — LOW (ref 13–17)
MCHC RBC-ENTMCNC: 26 PG — LOW (ref 27–34)
MCHC RBC-ENTMCNC: 31.2 GM/DL — LOW (ref 32–36)
MCV RBC AUTO: 83.5 FL — SIGNIFICANT CHANGE UP (ref 80–100)
PLATELET # BLD AUTO: 283 K/UL — SIGNIFICANT CHANGE UP (ref 150–400)
POTASSIUM SERPL-MCNC: 4.2 MMOL/L — SIGNIFICANT CHANGE UP (ref 3.5–5.3)
POTASSIUM SERPL-SCNC: 4.2 MMOL/L — SIGNIFICANT CHANGE UP (ref 3.5–5.3)
RBC # BLD: 4.8 M/UL — SIGNIFICANT CHANGE UP (ref 4.2–5.8)
RBC # FLD: 14.7 % — HIGH (ref 10.3–14.5)
SODIUM SERPL-SCNC: 144 MMOL/L — SIGNIFICANT CHANGE UP (ref 135–145)
WBC # BLD: 12.08 K/UL — HIGH (ref 3.8–10.5)
WBC # FLD AUTO: 12.08 K/UL — HIGH (ref 3.8–10.5)

## 2022-05-30 PROCEDURE — 99239 HOSP IP/OBS DSCHRG MGMT >30: CPT

## 2022-05-30 PROCEDURE — 99231 SBSQ HOSP IP/OBS SF/LOW 25: CPT

## 2022-05-30 RX ADMIN — LIDOCAINE 2 PATCH: 4 CREAM TOPICAL at 09:51

## 2022-05-30 RX ADMIN — Medication 125 MICROGRAM(S): at 09:53

## 2022-05-30 RX ADMIN — DULOXETINE HYDROCHLORIDE 60 MILLIGRAM(S): 30 CAPSULE, DELAYED RELEASE ORAL at 09:52

## 2022-05-30 RX ADMIN — Medication 81 MILLIGRAM(S): at 09:53

## 2022-05-30 RX ADMIN — PANTOPRAZOLE SODIUM 40 MILLIGRAM(S): 20 TABLET, DELAYED RELEASE ORAL at 06:35

## 2022-05-30 RX ADMIN — APIXABAN 5 MILLIGRAM(S): 2.5 TABLET, FILM COATED ORAL at 09:53

## 2022-05-30 RX ADMIN — Medication 1 TABLET(S): at 09:52

## 2022-05-30 RX ADMIN — LIDOCAINE 2 PATCH: 4 CREAM TOPICAL at 09:46

## 2022-05-30 RX ADMIN — Medication 50 MILLIGRAM(S): at 09:52

## 2022-05-30 NOTE — DISCHARGE NOTE NURSING/CASE MANAGEMENT/SOCIAL WORK - PATIENT PORTAL LINK FT
You can access the FollowMyHealth Patient Portal offered by [facility] by registering at the following website: [email or web address]. By joining [employer]’s FollowMyHealth portal, you will also be able to view your health information using other applications (apps) compatible with our system.

## 2022-05-30 NOTE — DISCHARGE NOTE PROVIDER - PROVIDER TOKENS
FREE:[LAST:[PCP],PHONE:[(   )    -],FAX:[(   )    -],FOLLOWUP:[1-3 days]],FREE:[LAST:[cardiologist],PHONE:[(   )    -],FAX:[(   )    -],FOLLOWUP:[1 week]],FREE:[LAST:[orthopedic specialist],PHONE:[(   )    -],FAX:[(   )    -],FOLLOWUP:[2 weeks]]

## 2022-05-30 NOTE — PROGRESS NOTE ADULT - ASSESSMENT
This is a 87 year old male with hx of A. Fib only on asa 81mg daily,[id number] Score: 6, chronic lacunar infarctions,  presented to [facility] for suicidal ideations. Pt dose not recall stating he wants to kill himself.  Pt has high thrombotic risks and high falls risks.  Pt can be on an oral ac as long as he is in a supervised setting.    5-: Discussed with Dr. Dickerson and she states pt is in consistent A. Fib.  She agrees with the of Eliquis in a supervised setting and she is agreeable with plan      plan:  : Continue  eliquis 5mg twice a day  :monitor for falls ad bleeding  :Daily cbc  :le venodynes  :oob as tolerated  :will f/u

## 2022-05-30 NOTE — DISCHARGE NOTE PROVIDER - NSDCMRMEDTOKEN_GEN_ALL_CORE_FT
apixaban 5 mg oral tablet: 1 tab(s) orally every 12 hours  aspirin 81 mg oral delayed release tablet: 1 tab(s) orally once a day  bisacodyl 5 mg oral delayed release tablet: 1 tab(s) orally once a day (at bedtime)  digoxin 125 mcg (0.125 mg) oral tablet: 1 tab(s) orally once a day  DULoxetine 60 mg oral delayed release capsule: 1 cap(s) orally once a day  lidocaine 4% topical film: 2 patches qd Apply topically to affected area once a day to the left shoulder  melatonin 1 mg oral tablet: 1 tab(s) orally once a day (at bedtime)  metoprolol succinate 50 mg oral tablet, extended release: 1 tab(s) orally once a day  Multiple Vitamins oral tablet: 1 tab(s) orally once a day  pantoprazole 40 mg oral delayed release tablet: 1 tab(s) orally once a day (before a meal)  simvastatin 20 mg oral tablet: 1 tab(s) orally once a day (at bedtime)  Tylenol 8 Hour 650 mg oral tablet, extended release: 1 tab(s) orally every 8 hours, As Needed -for mild pain

## 2022-05-30 NOTE — DISCHARGE NOTE PROVIDER - CARE PROVIDER_API CALL
PCP,   Phone: (   )    -  Fax: (   )    -  Follow Up Time: 1-3 days    cardiologist,   Phone: (   )    -  Fax: (   )    -  Follow Up Time: 1 week    orthopedic specialist,   Phone: (   )    -  Fax: (   )    -  Follow Up Time: 2 weeks

## 2022-05-30 NOTE — PROGRESS NOTE ADULT - SUBJECTIVE AND OBJECTIVE BOX
HPI:    Patient is a 86 y/o/m with history of dementia, that was admitted in the hospital 5/26 with suicidal ideations. Patient denies any HA, CP, SOB. No fevers, chills or shakes. Labs and vitals reviewed. This being said, according to the patient's ED note -  patient had stated to the daughter out of context that he wanted to kill himself. Patient explained to me that he was joking. Currently, patient denies any suicidal ideations.     Patient is limited historian, but does describe me what happened. Unable to recall what medical problems he has. Patient's family phone number not in the chart to obtain further history or to have a accurate GOC discussions.    Addendum:   Care discussed with patient's daughter. Since easter -  patient has been having a significant cognitive decline. Has been in Nursing home facility (7.5 weeks).  [facility] - Memory care - patient is having a very hard time adjusting to the facility (patient is sitting in a same place all the time /  dementia patients around). Care discussed with Social work -  as patient does not needs to be in the hospital and needs placement. Care discussed with patient's daughter isha and social work.      (27 May 2022 11:17)      Patient is a 87y old  Male who presents with a chief complaint of AMS /  Delirium /  Suicidal Ideations (29 May 2022 12:15)      Consulted by Dr. Ashley Dickerson  for VTE prophylaxis, risk stratification, and anticoagulation management.    PAST MEDICAL & SURGICAL HISTORY:  Bladder cancer      HTN (hypertension)      Hyperlipidemia      Paroxysmal atrial fibrillation on 81mg asa      Dementia          FAMILY HISTORY:      Interval Note: Pt seen at bedside on [facility].  Discussed the use of Eliquis as hs anticoagulation medication.  pt is unable to comprehend.  Spoke with Dr. Dickerson and she states she has already informed pt's daughter of the use of Eliquis with benefits and risks.     5/30/2022: Pt seen at bedside on [facility]. sleepy, confused, tolerating Eliquis            IMPROVE VTE Individual Risk Assessment    RISK                                                                Points    [  ] Previous VTE                                                  3    [  ] Thrombophilia                                               2    [  ] Lower limb paralysis                                      2        (unable to hold up >15 seconds)      [  ] Current Cancer                                              2         (within 6 months)    [  ] Immobilization > 24 hrs                                1    [  ] ICU/CCU stay > 24 hours                              1    [  x] Age > 60                                                      1    IMPROVE VTE Score _____1____    IMPROVE Score 0-1: Low Risk, No VTE prophylaxis required for most patients, encourage ambulation.   IMPROVE Score 2-3: At risk, pharmacologic VTE prophylaxis is indicated for most patients (in the absence of a contraindication)  IMPROVE Score > or = 4: High Risk, pharmacologic VTE prophylaxis is indicated for most patients (in the absence of a contraindication)    IHE5OB4-DPLg Score: 6    IMPROVE Bleeding Risk Score: 3.5    Falls Risk:   High (  )  Mod (x  )  Low (  )  crcl: 104.8        cr:.64          BMI 31                Denies any personal or familial history of clotting or bleeding disorders.    Allergies    No Known Allergies    Intolerances        REVIEW OF SYSTEMS    (  )Fever	     (  )Constipation	(  )SOB				(  )Headache	(  )Dysuria  (  )Chills	     (  )Melena	(  )Dyspnea present on exertion	                    (  )Dizziness                    (  )Polyuria  (  )Nausea	     (  )Hematochezia	(  )Cough			                    (  )Syncope   	(  )Hematuria  (  )Vomiting    (  )Chest Pain	(  )Wheezing			(  )Weakness  (  )Diarrhea     (  )Palpitations	(  )Anorexia			( x )Myalgia    Pertinent positives in HPI and daily subjective.  All other ROS negative.    Vital Signs Last 24 Hrs  T(C): 36.8 (29 May 2022 22:08), Max: 36.8 (29 May 2022 22:08)  T(F): 98.2 (29 May 2022 22:08), Max: 98.2 (29 May 2022 22:08)  HR: 62 (29 May 2022 22:08) (62 - 78)  BP: 151/65 (29 May 2022 22:08) (134/69 - 151/65)  BP(mean): --  RR: 18 (29 May 2022 22:08) (18 - 18)  SpO2: 98% (29 May 2022 22:08) (97% - 98%)  PHYSICAL EXAM:    Constitutional: Appears Well    Neurological: A& O x 2 person and place but does not know why he is in the hosptial, thinks he lives with his daughter,    Skin: Warm    Respiratory and Thorax: normal effort; Breath sounds: normal; No rales/wheezing/rhonchi  	  Cardiovascular: S1, S2, regular, NMBR	    Gastrointestinal: BS + x 4Q, nontender	    Genitourinary:  Bladder nondistended, nontender    Musculoskeletal:   General Right:   no muscle/joint tenderness,   normal tone, no joint swelling,   ROM: full	    General Left:   no muscle/joint tenderness,   normal tone, no joint swelling,   ROM: full    Lower extrems:   Right: no calf tenderness              negative medardo's sign               + pedal pulses    Left:   no calf tenderness              negative medardo's sign               + pedal pulses                        12.5   12.08 )-----------( 283      ( 30 May 2022 06:22 )             40.1       05-30    144  |  109<H>  |  22  ----------------------------<  113<H>  4.2   |  32<H>  |  0.80    Ca    8.7      30 May 2022 06:22    TPro  6.0  /  Alb  2.9<L>  /  TBili  0.4  /  DBili  x   /  AST  13<L>  /  ALT  19  /  AlkPhos  77  05-29                              12.6   12.55 )-----------( 273      ( 29 May 2022 06:43 )             39.6       05-29    141  |  106  |  23  ----------------------------<  104<H>  3.5   |  31  |  0.76    Ca    8.6      29 May 2022 06:43    TPro  6.0  /  Alb  2.9<L>  /  TBili  0.4  /  DBili  x   /  AST  13<L>  /  ALT  19  /  AlkPhos  77  05-29    < from: CT Head No Cont (05.27.22 @ 04:27) >  IMPRESSION:    No acute intracranial bleeding.    Volume loss, chronic microvascular ischemic changes, and chronic lacunar   infarctions.    < end of copied text >    				    MEDICATIONS  (STANDING):  apixaban 5 milliGRAM(s) Oral every 12 hours  aspirin enteric coated 81 milliGRAM(s) Oral daily  bisacodyl 5 milliGRAM(s) Oral at bedtime  digoxin     Tablet 125 MICROGram(s) Oral daily  DULoxetine 60 milliGRAM(s) Oral daily  lidocaine   4% Patch 2 Patch Transdermal daily  melatonin 5 milliGRAM(s) Oral at bedtime  metoprolol succinate ER 50 milliGRAM(s) Oral daily  multivitamin 1 Tablet(s) Oral daily  pantoprazole    Tablet 40 milliGRAM(s) Oral before breakfast  simvastatin 20 milliGRAM(s) Oral at bedtime          DVT Prophylaxis:  LMWH                   (  )  Heparin SQ           (  )  Coumadin             (  )  Xarelto                  (  )  Eliquis                   ( x )  Venodynes           ( x )  Ambulation          ( x )  UFH                       (  )  Contraindicated  (  )  EC Aspirin             (  )

## 2022-05-30 NOTE — DISCHARGE NOTE PROVIDER - NSDCCPCAREPLAN_GEN_ALL_CORE_FT
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  supportive care      SECONDARY DISCHARGE DIAGNOSES  Diagnosis: Chronic atrial fibrillation  Assessment and Plan of Treatment: started on Eliquis 5 mg twice daily  follow up with cardiologist within 1-2 week for further monitoring and management  continue metoprolol    Diagnosis: Falls  Assessment and Plan of Treatment: physical therapy for balance exercises    Diagnosis: Left shoulder pain  Assessment and Plan of Treatment: lidocaine patches, tylenol as needed   follow up with orthopedic doctor for further work-up within 1 -2 weeks    Diagnosis: CAD (coronary artery disease)  Assessment and Plan of Treatment: continue baby dose aspirin with food    Diagnosis: Renal lesion  Assessment and Plan of Treatment: you have right renal lesion on your Ct of the abdomen follow up with your urologist for further work-up  given your history of bladder cancer in the past    Diagnosis: Chronic back pain  Assessment and Plan of Treatment: continue duloxetin, follow up with orthopedic doctor    Diagnosis: Leukocytosis  Assessment and Plan of Treatment: chronic , likely due to underlying malignancy   renal lesions noted, no signs of infections

## 2022-05-30 NOTE — DISCHARGE NOTE PROVIDER - HOSPITAL COURSE
Patient is a 87y old  Male who presents with a chief complaint of AMS /  Delirium /  Suicidal Ideations      HPI:      88 y/o/m with PMH of Dementia, Paroxysmal A fib on ASA 81 only, CAD , h/o bladder cancer, HLD, HTN, macular degeneration, hearing loss, chronic back pain admitted to the hospital  on 5/27 with suicidal ideations. according to the patient's ED note -  patient had stated to the daughter out of context that he wanted to kill himself. Patient explained to me that he was joking. Currently, patient denies any suicidal ideations. Patient is limited historian, but does describe me what happened. Unable to recall what medical problems he has.  as per daughter. Since easter -  patient has been having a significant cognitive decline. Has been in Nursing home facility (7.5 weeks).  [facility] - Memory care - patient is having a very hard time adjusting to the facility (patient is sitting in a same place all the time /  dementia patients around).     5/28 -  Patient seen and examined at bedside earlier today, left shoulder pain stiffness , unable to lift left arm  due to pain , denies cp, dyspnea, abdominal pain, heard to hear , poor historian   5/29 - pt seen and examined, afebrile , denies ha, cp, dyspnea, abdominal pain, tolerating po intake   5/30 - pt seen and examined, feels the same, left shoulder pain better, tolerating po intake     Review of system- Rest of the review of system are negative except mentioned in HPI    Vitals reviewed for last 24 hours  T(C): 36.6 (05-30-22 @ 09:48), Max: 36.8 (05-29-22 @ 22:08)  T(F): 97.8 (05-30-22 @ 09:48), Max: 98.2 (05-29-22 @ 22:08)  HR: 77 (05-30-22 @ 09:48) (62 - 78)  BP: 149/71 (05-30-22 @ 09:48) (134/69 - 151/65)  RR: 18 (05-30-22 @ 09:48) (18 - 18)  SpO2: 98% (05-30-22 @ 09:48) (97% - 98%)  Wt(kg):     Physical exam:   General : NAD, appear to be of stated age , well groomed   NERVOUS SYSTEM:  Alert & Oriented X2, non- focal exam, Motor Strength 5/5 B/L upper and lower extremities; DTRs 2+ intact and symmetric, LUE limited ROM due to pain in left shoulder  HEAD:  Atraumatic, Normocephalic  EYES: EOMI, PERRLA, conjunctiva and sclera clear  HEENT: Moist mucous membranes, Supple neck , No JVD  CHEST: Clear to auscultation bilaterally; No rales, no rhonchi, no wheezing  HEART: Regular rate and rhythm; No murmurs, no rubs or gallops  ABDOMEN: Soft, Non-tender, Non-distended; Bowel sounds present, no guarding , no peritoneal irritation   GENITOURINARY- Voiding, no suprapubic tenderness  EXTREMITIES:  2+ Peripheral Pulses, No clubbing, cyanosis,   edema  MUSCULOSKELETAL:- No muscle tenderness, Muscle tone normal, No joint tenderness, no Joint swelling,  Joint ROM –normal   SKIN-no rash, no lesion    LABS: all reviewed    Cognitive decline due to probable progression of vascular type dementia   Bilateral chronic lacunar infarction with cerebrovascular changes on CT   Paroxysmal A fib only on ASA for CAD s/p stents   Mild hyperammoniemia, resolved   - CT : No acute intracranial bleeding. Volume loss, chronic microvascular ischemic changes, and chronic lacunar   infarctions.  - TSH wnl, check B12, folate , ammonia - mildly elevated   - patient would benefit from AC given lacunar infarction and A fib on EKG   - cardiology consult for recommendations- d/w Dr. Villanueva - recs AC with eliquis  - c/w ASA 18 , BB, digoxin ,  statins, troponin x 1 neg, BNP elevated 1719  -  elevated BNP , follows with cardiologist in July   - 5/29 - lactulose 10 gm , repeat ammonia  wnl now  Suicidal ideations, resolved   - psychiatry evaluation - dementia ,ruled out infectious causes   Left shoulder pain with h/o frequent falls ruled out Fractures likely OA   - xray - no fracture  - lidocaine patch  - vit D level 55   Mild leukocytosis h/o bladder cancer as per daughter  1.9 cm lateral RIGHT renal inter-/lower pole higher than fluid  attenuation lesion, indeterminate.  - us kidney for better evaluation  - renal function wnl, CRP 18   -  urine and blood cultures - neg   Chronic back pain - c/w duloxetine  HTN - c/w BB, HTCZ stopped ( increases risk of fall in elderly )     Advance directives   DNR/DNI/no feeding tubes   [phone] HCP daughter Elisa Wood updated 5/28,5/29 , 5/30    Final diagnosis, treatment plan, and follow-up recommendations were discussed and explained to the patient.   The patient was given an opportunity to ask questions concerning the diagnosis and treatment plan.   The patient acknowledged understanding of the diagnosis, treatment, and follow-up recommendations.   The patient was advised to seek urgent care upon discharge if worsening symptoms develop prior to scheduled follow-up.   Time spent on discharge included time with the patient, and also coordinating discharge care as outlined below.  Discharge note faxed to PCP with my contact information to call me back   PCP at assisted facility   Total time spent: 45 min

## 2022-05-30 NOTE — PROGRESS NOTE ADULT - REASON FOR ADMISSION
AMS /  Delirium /  Suicidal Ideations

## 2022-05-30 NOTE — DISCHARGE NOTE PROVIDER - NSDCCPTREATMENT_GEN_ALL_CORE_FT
PRINCIPAL PROCEDURE  Procedure: CT head w con  Findings and Treatment: ACC: [id number] EXAM:  CT BRAIN                        PROCEDURE DATE:  05/27/2022    INTERPRETATION:  HISTORY: Altered mental status.  COMPARISON: None.  TECHNIQUE: Axial noncontrast CT images from the skull base to the vertex   were obtained and submitted for interpretation. Coronal and sagittal   reformatted images were performed. Bone and soft tissue windows were   evaluated.  FINDINGS:  There is no acute intracranial mass-effect, hemorrhage, midline shift, or   abnormal extra-axial fluid collection.  Chronic bilateral lacunar infarctions are present. Patchy and confluent   regions of periventricular and deep cerebral white matter hypoattenuation   due to chronic microangiopathic ischemic changes. Atheromatous   calcifications along the carotid siphons are present.  Mild centrally predominant cerebral volume loss noted.  No evidence of   hydrocephalus. Basal cisterns are patent.  Visualized paranasal sinuses  and mastoid air cells are clear. Calvarium   is intact.  IMPRESSION:  No acute intracranial bleeding.  Volume loss, chronic microvascular ischemic changes, and chronic lacunar   infarctions.  --- End of Report ---        SECONDARY PROCEDURE  Procedure: CT abdomen  Findings and Treatment:   ACC: [id number] EXAM:  CT ABDOMEN AND PELVIS OC                        PROCEDURE DATE:  05/28/2022    INTERPRETATION:  CLINICAL INFORMATION: Leukocytosis. History bladder   cancer. Assess for acute pathology.  COMPARISON: None.  CONTRAST/COMPLICATIONS:  IV Contrast: None.  Oral Contrast: Positive oral contrast.  Complications: None reported.  PROCEDURE:  CT of the Abdomen and Pelvis was performed.  Sagittal and coronal reformats were performed.  FINDINGS:  Evaluation of the solid visceral organs and vasculature is limited   without the administration of intravenous contrast.  LOWER CHEST: Mild bibasilar subsegmental atelectasis. Aortic root and   coronary artery calcifications..  LIVER: Within normal limits.  BILE DUCTS:Normal caliber.  GALLBLADDER: Cholelithiasis.  SPLEEN: Within normal limits.  PANCREAS: Within normal limits.  ADRENALS: Within normal limits.  KIDNEYS/URETERS: A 1.9 cm lateral right inter-/lower pole higher than   fluid attenuation lesion, indeterminate. A subcentimeter medial right   interpole hemorrhagic cyst. A left lower pole cyst. No hydronephrosis.  BLADDER: Within normal limits.  REPRODUCTIVE ORGANS: Prostatomegaly measuring 5.5 cm in transverse   dimension.  BOWEL: Colonic diverticulosis without acute diverticulitis. No bowel   obstruction. Appendix is normal.  PERITONEUM: No ascites.  VESSELS: Atherosclerotic changes.  RETROPERITONEUM/LYMPH NODES: No lymphadenopathy.  ABDOMINAL WALL: Within normal limits.  BONES: Degenerative changes. Lower lumbar spine orthopedic hardware.  IMPRESSION:  Limited noncontrast study.  A 1.9 cm lateral RIGHT renal inter-/lower pole higher than fluid   attenuation lesion, indeterminate. More definitive characterization may   be obtained withtargeted ultrasound or contrast-enhanced renal protocol   MRI on a nonemergent basis.  --- End of Report ---      Procedure: X-ray of left shoulder, axillary view  Findings and Treatment: ACC: [id number] EXAM:  XR SHOULDER COMP MIN 2V LT                        PROCEDURE DATE:  05/28/2022    INTERPRETATION:  History:  Left arm pain  Technique: Left shoulder 3 views  Comparison: None  Findings: There is no evidence of glenohumeral dislocation. No fracture.   No AC separation.  Adjacent ribs are unremarkable. Clear visualized lung   with no pneumothorax.  Impression: No fracture/dislocation  --- End of Report ---      Procedure: US kidney complete  Findings and Treatment:   ACC: [id number] EXAM:  US KIDNEYS AND BLADDER                        PROCEDURE DATE:  05/28/2022    INTERPRETATION:  CLINICAL INFORMATION: 2 cm indeterminate right renal   lesion.  COMPARISON: Abdominal CT dated 05/20/2022  TECHNIQUE: Sonography of the kidneys and bladder.  FINDINGS:  Right kidney: 10.7 cm. Normal in size and echogenicity without   hydronephrosis. There is 2.0 x 1.8 cm complex cystic lesion in the   midpole with internal septations and questionable small echogenic solid   component.  Left kidney: 11.6 cm. No renal mass, hydronephrosis or calculi.  Urinary bladder: Within normal limits.  IMPRESSION:  Complex cystic right renal lesion, corresponding to the indeterminate   lesion seen on the prior CT. Consider further characterization with   contrast enhanced renal MRI.

## 2022-05-30 NOTE — DISCHARGE NOTE NURSING/CASE MANAGEMENT/SOCIAL WORK - NSTRANSFERBELONGINGSDISPO_GEN_A_NUR
with patient Wedding band in personal belongings envelope, replaced on left 4th finger prior to d/c/with patient

## 2022-05-30 NOTE — DISCHARGE NOTE NURSING/CASE MANAGEMENT/SOCIAL WORK - NSDCPEFALRISK_GEN_ALL_CORE
For information on Fall & Injury Prevention, visit: [email or web address] OR  [email or web address] OR  https://www.cdc.gov/steadi/patient.html

## 2022-05-31 VITALS
DIASTOLIC BLOOD PRESSURE: 60 MMHG | OXYGEN SATURATION: 94 % | SYSTOLIC BLOOD PRESSURE: 119 MMHG | RESPIRATION RATE: 18 BRPM | TEMPERATURE: 99 F | HEART RATE: 93 BPM

## 2022-06-02 LAB
CULTURE RESULTS: SIGNIFICANT CHANGE UP
CULTURE RESULTS: SIGNIFICANT CHANGE UP
SPECIMEN SOURCE: SIGNIFICANT CHANGE UP
SPECIMEN SOURCE: SIGNIFICANT CHANGE UP

## 2022-06-03 DIAGNOSIS — M19.012 PRIMARY OSTEOARTHRITIS, LEFT SHOULDER: ICD-10-CM

## 2022-06-03 DIAGNOSIS — Z66 DO NOT RESUSCITATE: ICD-10-CM

## 2022-06-03 DIAGNOSIS — E78.5 HYPERLIPIDEMIA, UNSPECIFIED: ICD-10-CM

## 2022-06-03 DIAGNOSIS — I48.20 CHRONIC ATRIAL FIBRILLATION, UNSPECIFIED: ICD-10-CM

## 2022-06-03 DIAGNOSIS — I25.10 ATHEROSCLEROTIC HEART DISEASE OF NATIVE CORONARY ARTERY WITHOUT ANGINA PECTORIS: ICD-10-CM

## 2022-06-03 DIAGNOSIS — G89.29 OTHER CHRONIC PAIN: ICD-10-CM

## 2022-06-03 DIAGNOSIS — R45.851 SUICIDAL IDEATIONS: ICD-10-CM

## 2022-06-03 DIAGNOSIS — I10 ESSENTIAL (PRIMARY) HYPERTENSION: ICD-10-CM

## 2022-06-03 DIAGNOSIS — E72.20 DISORDER OF UREA CYCLE METABOLISM, UNSPECIFIED: ICD-10-CM

## 2022-06-03 DIAGNOSIS — F01.50 VASCULAR DEMENTIA WITHOUT BEHAVIORAL DISTURBANCE: ICD-10-CM

## 2022-06-03 DIAGNOSIS — G30.1 ALZHEIMER'S DISEASE WITH LATE ONSET: ICD-10-CM

## 2022-06-03 DIAGNOSIS — Z85.51 PERSONAL HISTORY OF MALIGNANT NEOPLASM OF BLADDER: ICD-10-CM

## 2022-06-03 DIAGNOSIS — N28.89 OTHER SPECIFIED DISORDERS OF KIDNEY AND URETER: ICD-10-CM

## 2022-06-03 DIAGNOSIS — M54.9 DORSALGIA, UNSPECIFIED: ICD-10-CM

## 2022-06-03 DIAGNOSIS — D72.829 ELEVATED WHITE BLOOD CELL COUNT, UNSPECIFIED: ICD-10-CM

## 2022-06-19 ENCOUNTER — EMERGENCY (EMERGENCY)
Facility: HOSPITAL | Age: 87
LOS: 0 days | Discharge: HOME | End: 2022-06-20
Attending: EMERGENCY MEDICINE
Payer: MEDICARE

## 2022-06-19 VITALS
WEIGHT: 250 LBS | TEMPERATURE: 98 F | SYSTOLIC BLOOD PRESSURE: 149 MMHG | RESPIRATION RATE: 19 BRPM | OXYGEN SATURATION: 97 % | DIASTOLIC BLOOD PRESSURE: 87 MMHG | HEART RATE: 97 BPM | HEIGHT: 73 IN
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
## 2022-06-19 DIAGNOSIS — M25.512 PAIN IN LEFT SHOULDER: ICD-10-CM

## 2022-06-19 DIAGNOSIS — S00.01XA ABRASION OF SCALP, INITIAL ENCOUNTER: ICD-10-CM

## 2022-06-19 DIAGNOSIS — I10 ESSENTIAL (PRIMARY) HYPERTENSION: ICD-10-CM

## 2022-06-19 DIAGNOSIS — F03.90 UNSPECIFIED DEMENTIA WITHOUT BEHAVIORAL DISTURBANCE: ICD-10-CM
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
## 2022-06-19 DIAGNOSIS — S09.90XA UNSPECIFIED INJURY OF HEAD, INITIAL ENCOUNTER: ICD-10-CM

## 2022-06-19 DIAGNOSIS — I25.10 ATHEROSCLEROTIC HEART DISEASE OF NATIVE CORONARY ARTERY WITHOUT ANGINA PECTORIS: ICD-10-CM

## 2022-06-19 DIAGNOSIS — Z79.82 LONG TERM (CURRENT) USE OF ASPIRIN: ICD-10-CM

## 2022-06-19 DIAGNOSIS — I48.0 PAROXYSMAL ATRIAL FIBRILLATION: ICD-10-CM
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
## 2022-06-19 DIAGNOSIS — E78.5 HYPERLIPIDEMIA, UNSPECIFIED: ICD-10-CM
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
## 2022-06-19 PROCEDURE — 72125 CT NECK SPINE W/O DYE: CPT | Mod: MA

## 2022-06-19 PROCEDURE — 70450 CT HEAD/BRAIN W/O DYE: CPT | Mod: MA

## 2022-06-19 PROCEDURE — 72125 CT NECK SPINE W/O DYE: CPT | Mod: 26,MA

## 2022-06-19 PROCEDURE — 70450 CT HEAD/BRAIN W/O DYE: CPT | Mod: 26,MA

## 2022-06-19 PROCEDURE — 73030 X-RAY EXAM OF SHOULDER: CPT | Mod: 26,LT

## 2022-06-19 PROCEDURE — 99284 EMERGENCY DEPT VISIT MOD MDM: CPT

## 2022-06-19 PROCEDURE — 99284 EMERGENCY DEPT VISIT MOD MDM: CPT | Mod: 25

## 2022-06-19 PROCEDURE — 73030 X-RAY EXAM OF SHOULDER: CPT | Mod: LT

## 2022-06-19 NOTE — ED PROVIDER NOTE - ENMT, MLM
Airway patent, Nasal mucosa clear. Mouth with normal mucosa. Throat has no vesicles, no oropharyngeal exudates and uvula is midline. 3 cm left frontal laceration

## 2022-06-19 NOTE — ED PROVIDER NOTE - OBJECTIVE STATEMENT
88 y/o/m with PMH of Dementia, Paroxysmal A fib on ASA 81 only, CAD , h/o bladder cancer, HLD, HTN, macular degeneration, hearing loss, chronic back pain Brought in by EMS for apparent mechanical fall while using his walker at home.  Patient is noted to have an abrasion on his left frontal scalp and complains of left shoulder pain.  Patient is unable to provide any detailed HPI or review of systems due to his baseline dementia.  Patient denies any chest pain or shortness of breath at this time.

## 2022-06-19 NOTE — ED PROVIDER NOTE - CLINICAL SUMMARY MEDICAL DECISION MAKING FREE TEXT BOX
Neuro alert called at triage: CT head and C-spine to rule out intracranial hemorrhage and/or fracture.  X-ray of the left shoulder for rule out of fracture or dislocation

## 2022-06-19 NOTE — ED PROVIDER NOTE - PATIENT PORTAL LINK FT
You can access the FollowMyHealth Patient Portal offered by [facility] by registering at the following website: [email or web address]. By joining [facility]’s FollowMyHealth portal, you will also be able to view your health information using other applications (apps) compatible with our system.

## 2022-06-19 NOTE — ED ADULT NURSE NOTE - OBJECTIVE STATEMENT
bib EMS fo mechanical fall "tripped over walker". witnessed by ana.  denies LOC ib baby asa 81 mg Neuro alert called at rt pt presents s/p mechanical fall while walking with walker. as per EMS, it was witnessed by family. -LOC. 81ASA daily. neuro alert called at triage. pt A&OX3

## 2022-06-19 NOTE — ED ADULT NURSE NOTE - CHIEF COMPLAINT QUOTE
pt presents s/p mechanical fall while walking with walker. as per EMS, it was witnessed by family. -LOC. 81ASA daily. neuro alert called at triage

## 2022-06-20 VITALS
SYSTOLIC BLOOD PRESSURE: 165 MMHG | HEART RATE: 87 BPM | TEMPERATURE: 98 F | DIASTOLIC BLOOD PRESSURE: 84 MMHG | OXYGEN SATURATION: 98 % | RESPIRATION RATE: 19 BRPM

## 2022-06-20 PROBLEM — I10 ESSENTIAL (PRIMARY) HYPERTENSION: Chronic | Status: ACTIVE | Noted: 2022-05-27

## 2022-06-20 PROBLEM — C67.9 MALIGNANT NEOPLASM OF BLADDER, UNSPECIFIED: Chronic | Status: ACTIVE | Noted: 2022-05-27

## 2022-06-20 PROBLEM — E78.5 HYPERLIPIDEMIA, UNSPECIFIED: Chronic | Status: ACTIVE | Noted: 2022-05-27

## 2022-06-20 PROBLEM — I48.0 PAROXYSMAL ATRIAL FIBRILLATION: Chronic | Status: ACTIVE | Noted: 2022-05-27

## 2022-06-20 PROBLEM — F03.90 UNSPECIFIED DEMENTIA WITHOUT BEHAVIORAL DISTURBANCE: Chronic | Status: ACTIVE | Noted: 2022-05-27

## 2022-07-13 NOTE — H&P ADULT - NSHPPHYSICALEXAM_GEN_ALL_CORE
Physical Exam:   GENERAL APPEARANCE:  Very deconditioned and frail  T(C): 36.8 (05-27-22 @ 07:26), Max: 37.1 (05-26-22 @ 20:14)  HR: 79 (05-27-22 @ 07:26) (64 - 79)  BP: 160/99 (05-27-22 @ 07:26) (142/74 - 160/99)  RR: 18 (05-27-22 @ 07:26) (17 - 18)  HEENT:  Head is normocephalic    Skin:  Thin and dry /  dry oral mucousa  NECK:  Supple without lymphadenopathy.   HEART:  Regular rate and rhythm. normal S1 and S2, No M/R/G  LUNGS:  Good ins/exp effort, no W/R/R/C  ABDOMEN:  Soft, nontender, nondistended with good bowel sounds heard  EXTREMITIES:  Without cyanosis, clubbing or edema.   NEUROLOGICAL:  Gross nonfocal
Speaking Coherently

## 2022-08-21 NOTE — PROGRESS NOTE ADULT - PROBLEM/PLAN-2
DISPLAY PLAN FREE TEXT
DISPLAY PLAN FREE TEXT
FAMILY HISTORY:  No pertinent family history in first degree relatives

## 2023-10-26 NOTE — PROGRESS NOTE BEHAVIORAL HEALTH - ORIENTED TO PERSON
Blood pressure good. Continue current medications. Pt advised to continue low Na diet and to exercise on a regular basis. Yes

## 2024-06-05 NOTE — ED ADULT NURSE NOTE - NS ED NURSE AMBULANCES2
Pt called in requesting a refill of Gabapentin 600 mg     Pt's pharmacy is [facility]      Ambulnz

## 2025-02-28 NOTE — ED ADULT NURSE NOTE - CHIEF COMPLAINT
[facility]    Sandi Linder Patient Status:  Observation    1937 MRN [id number]   Location [facility] Attending Remington Hayes DO   Hosp Day # 0 PCP Norm Mendoza MD         PATIENT NAME: Sandi Linder  DATE OF OPERATION: 2025    PREOPERATIVE DIAGNOSIS:  Anemia  POSTOPERATIVE DIAGNOSIS:  Mild gastritis, may be contributing to anemia  No risk of bleed seen    PROCEDURE PERFORMED: Upper endoscopy with MAC sedation  SURGEON: Pantera Sexton MD   MEDICATIONS: MAC IV in divided doses under the supervision of Anesthesia.  PROCEDURE AND FINDINGS: The patient was placed into the left lateral decubitus position after informed consent was obtained.  All questions were answered.  MAC IV sedation was administered.  The Olympus video gastroscope was introduced into the mouth and passed to the third portion of the duodenum.  The entire examined esophagus was normal.  There was mild diffuse antral gastritis.  The entire examined stomach,  including retroflexion view, was otherwise normal.  The entire examined duodenum was normal.   The gastroscope was removed from the patient.  The procedure was completed. There were no implants placed nor significant blood loss.  The patient tolerated the procedure well. There were no immediate post procedure complications.  Moderate sedation time:  None.  Deep sedation provided by anesthesia    RECOMMENDATIONS:  Continue Pepcid therapy.  Pt reports recent colonoscopy.  Will defer further inpt eval.  Resume Plavix  Regular diet  Outpt fu    Pantera Sexton MD      The patient is a 87y Male complaining of suicidal thoughts.